# Patient Record
Sex: FEMALE | Race: BLACK OR AFRICAN AMERICAN | NOT HISPANIC OR LATINO | URBAN - METROPOLITAN AREA
[De-identification: names, ages, dates, MRNs, and addresses within clinical notes are randomized per-mention and may not be internally consistent; named-entity substitution may affect disease eponyms.]

---

## 2021-05-27 ENCOUNTER — EMERGENCY (EMERGENCY)
Facility: HOSPITAL | Age: 51
LOS: 1 days | Discharge: HOME | End: 2021-05-27
Attending: EMERGENCY MEDICINE | Admitting: EMERGENCY MEDICINE
Payer: MEDICAID

## 2021-05-27 VITALS
SYSTOLIC BLOOD PRESSURE: 118 MMHG | HEART RATE: 83 BPM | RESPIRATION RATE: 14 BRPM | DIASTOLIC BLOOD PRESSURE: 66 MMHG | OXYGEN SATURATION: 100 % | TEMPERATURE: 98 F

## 2021-05-27 PROCEDURE — 99284 EMERGENCY DEPT VISIT MOD MDM: CPT

## 2021-05-27 RX ORDER — ACETAMINOPHEN 500 MG
650 TABLET ORAL ONCE
Refills: 0 | Status: COMPLETED | OUTPATIENT
Start: 2021-05-27 | End: 2021-05-27

## 2021-05-27 RX ORDER — IBUPROFEN 200 MG
600 TABLET ORAL ONCE
Refills: 0 | Status: COMPLETED | OUTPATIENT
Start: 2021-05-27 | End: 2021-05-27

## 2021-05-27 RX ADMIN — Medication 650 MILLIGRAM(S): at 18:36

## 2021-05-27 RX ADMIN — Medication 600 MILLIGRAM(S): at 18:36

## 2021-05-27 NOTE — ED PROVIDER NOTE - ATTENDING CONTRIBUTION TO CARE
I have personally performed a face to face bedside history and physical examination of this patient. I have discussed the history, examination, review of systems, assessment and plan of management with the resident. I have reviewed the electronic medical record and amended it to reflect my history, review of systems, physical exam, assessment and plan.    Brief HPI:  51 F no pertinent PMH here after inhaling clorox and ajax mix while cleaning the bathroom.  Patient states she mixed  then felt sx.  Feels better since arrival to ed.  Denies sob, nausea, vomiting, headache, cp.  Did not use ammonia containing product per patient.  No other complaints.     Vitals:   Reviewed    Exam:    GEN:  Non-toxic appearing, non-distressed, speaking full sentences, non-diaphoretic, AAOx3  HEENT:  NCAT, neck supple, EOMI, PERRLA, sclera anicteric, no conjunctival pallor or injection, no stridor, normal voice, no tonsillar exudate, uvula midline  CV:  regular rhythm and rate, s1/s2 audible, no murmurs, rubs or gallops, peripheral pulses 2+ and symmetric  PULM:  non-labored respirations, lungs clear to auscultation bilaterally, no wheezes, crackles or rales  ABD:  non distended, non-tender, no rebound, no guarding, negative Rodriguez's sign, bowel sounds normal, no cvat  MSK:  no gross deformity, non-tender extremities and joints, range of motion grossly normal appearing, no extremity edema, extremities warm and well perfused   NEURO:  AAOx3, CN II-XII intact, motor 5/5 in upper and lower extremities bilaterally, sensation grossly intact in extremities and trunk, no gait deficit  SKIN:  warm, dry, no rash or vesicles     A/P:  51 F no pertinent PMH here after inhaling clorox and ajax mix while cleaning the bathroom.   VSS.  Exam non-toxic appearing, lungs ctab, no focal neuro deficit, no gait deficit.  Patient states she feels better since arriving to ED.  Low c/f clinically significant toxic ingestion or inhalation.  Will obtain urine hcg.  Anticipate discharge.

## 2021-05-27 NOTE — ED PROVIDER NOTE - PROGRESS NOTE DETAILS
Samreen Vasquez EMIM PGY#1 feeling better after tylenol and ibuprofen. Will dc after urine pregnancy test

## 2021-05-27 NOTE — ED PROVIDER NOTE - OBJECTIVE STATEMENT
51F no pertinent PMH here after inhaling clorox and ajax mix while cleaning the bathroom. States that she felt dizziness and nausea, the nausea was alleviated by sour patch and the dizziness (felt unsteady on feet after standing up), has now dissipated, is feeling headache now. Patient states she did not eat prior. No other symptoms.

## 2021-05-27 NOTE — ED PROVIDER NOTE - CLINICAL SUMMARY MEDICAL DECISION MAKING FREE TEXT BOX
51F no pertinent PMH here for dizziness and headache after inhaling 51F no pertinent PMH here for dizziness and headache after inhaling . Only complaining of headache now. VSS, PE nonfocal, no FNDs. Will give tylenol and NSAID and reassess.

## 2021-05-27 NOTE — ED PROVIDER NOTE - NS ED ROS FT
Gen: No fever, normal appetite  Eyes: No eye irritation or discharge  ENT: No ear pain, congestion, sore throat  Resp: No cough or trouble breathing  Cardiovascular: No chest pain or palpitation  Gastroenteric: No nausea/vomiting, diarrhea, constipation  :  No change in urine output; no dysuria  MS: No joint or muscle pain  Skin: No rashes  Neuro: (+) headache; (+) dizziness no abnormal movements  Remainder negative, except as per the HPI

## 2021-05-27 NOTE — ED ADULT TRIAGE NOTE - CHIEF COMPLAINT QUOTE
Pt presents to ED for intermittent dizziness x2hrs. Pt states "2hrs ago I started cleaning by bathtub but I mixed 2 different kinds of clorox, bleach, ajax and dishwashing detergent." Denies palpitations, chest pain.

## 2021-05-27 NOTE — ED PROVIDER NOTE - PATIENT PORTAL LINK FT
You can access the FollowMyHealth Patient Portal offered by [facility] by registering at the following website: [email or web address]. By joining [employer] FollowMyHealth portal, you will also be able to view your health information using other applications (apps) compatible with our system.

## 2021-05-27 NOTE — ED PROVIDER NOTE - NSFOLLOWUPINSTRUCTIONS_ED_ALL_ED_FT
You were seen in the ED for headache and dizziness after inhaling An acute headache is pain or discomfort that starts suddenly and gets worse quickly. You may have an acute headache only when you feel stress or eat certain foods. Other acute headache pain can happen every day, and sometimes several times a day.     DISCHARGE INSTRUCTIONS:    Return to the emergency department if:   •You have severe pain.  •You have numbness or weakness on one side of your face or body.  •You have a headache that occurs after a blow to the head, a fall, or other trauma.   •You have a headache, are forgetful or confused, or have trouble speaking.  •You have a headache, stiff neck, and a fever.    Contact your healthcare provider if:   •You have a constant headache and are vomiting.  •You have a headache each day that does not get better, even after treatment.  •You have changes in your headaches, or new symptoms that occur when you have a headache.  •You have questions or concerns about your condition or care.    Medicines: You may need any of the following:   •Prescription pain medicine may be given. The medicine your healthcare provider recommends will depend on the kind of headaches you have. You will need to take prescription headache medicines as directed to prevent a problem called rebound headache. These headaches happen with regular use of pain relievers for headache disorders.  •NSAIDs, such as ibuprofen, help decrease swelling, pain, and fever. This medicine is available with or without a doctor's order. NSAIDs can cause stomach bleeding or kidney problems in certain people. If you take blood thinner medicine, always ask your healthcare provider if NSAIDs are safe for you. Always read the medicine label and follow directions.  •Acetaminophen decreases pain and fever. It is available without a doctor's order. Ask how much to take and how often to take it. Follow directions. Read the labels of all other medicines you are using to see if they also contain acetaminophen, or ask your doctor or pharmacist. Acetaminophen can cause liver damage if not taken correctly. Do not use more than 3 grams (3,000 milligrams) total of acetaminophen in one day.   •Antidepressants may be given for some kinds of headaches.   •Take your medicine as directed. Contact your healthcare provider if you think your medicine is not helping or if you have side effects. Tell him or her if you are allergic to any medicine. Keep a list of the medicines, vitamins, and herbs you take. Include the amounts, and when and why you take them. Bring the list or the pill bottles to follow-up visits. Carry your medicine list with you in case of an emergency.

## 2021-12-12 ENCOUNTER — EMERGENCY (EMERGENCY)
Facility: HOSPITAL | Age: 51
LOS: 1 days | Discharge: HOME | End: 2021-12-12
Attending: EMERGENCY MEDICINE | Admitting: EMERGENCY MEDICINE
Payer: MEDICAID

## 2021-12-12 VITALS
SYSTOLIC BLOOD PRESSURE: 159 MMHG | OXYGEN SATURATION: 100 % | RESPIRATION RATE: 16 BRPM | HEART RATE: 120 BPM | TEMPERATURE: 100 F | DIASTOLIC BLOOD PRESSURE: 90 MMHG

## 2021-12-12 LAB
ALBUMIN SERPL ELPH-MCNC: 3.9 G/DL — SIGNIFICANT CHANGE UP (ref 3.3–5)
ALP SERPL-CCNC: 89 U/L — SIGNIFICANT CHANGE UP (ref 40–120)
ALT FLD-CCNC: 45 U/L — HIGH (ref 4–33)
ANION GAP SERPL CALC-SCNC: 13 MMOL/L — SIGNIFICANT CHANGE UP (ref 7–14)
AST SERPL-CCNC: 48 U/L — HIGH (ref 4–32)
BASOPHILS # BLD AUTO: 0.02 K/UL — SIGNIFICANT CHANGE UP (ref 0–0.2)
BASOPHILS NFR BLD AUTO: 0.2 % — SIGNIFICANT CHANGE UP (ref 0–2)
BILIRUB SERPL-MCNC: 0.8 MG/DL — SIGNIFICANT CHANGE UP (ref 0.2–1.2)
BUN SERPL-MCNC: 10 MG/DL — SIGNIFICANT CHANGE UP (ref 7–23)
CALCIUM SERPL-MCNC: 9.5 MG/DL — SIGNIFICANT CHANGE UP (ref 8.4–10.5)
CHLORIDE SERPL-SCNC: 101 MMOL/L — SIGNIFICANT CHANGE UP (ref 98–107)
CO2 SERPL-SCNC: 22 MMOL/L — SIGNIFICANT CHANGE UP (ref 22–31)
CREAT SERPL-MCNC: 0.8 MG/DL — SIGNIFICANT CHANGE UP (ref 0.5–1.3)
EOSINOPHIL # BLD AUTO: 0 K/UL — SIGNIFICANT CHANGE UP (ref 0–0.5)
EOSINOPHIL NFR BLD AUTO: 0 % — SIGNIFICANT CHANGE UP (ref 0–6)
GLUCOSE SERPL-MCNC: 134 MG/DL — HIGH (ref 70–99)
HCT VFR BLD CALC: 40.4 % — SIGNIFICANT CHANGE UP (ref 34.5–45)
HGB BLD-MCNC: 12.6 G/DL — SIGNIFICANT CHANGE UP (ref 11.5–15.5)
IANC: 8.55 K/UL — HIGH (ref 1.5–8.5)
IMM GRANULOCYTES NFR BLD AUTO: 0.8 % — SIGNIFICANT CHANGE UP (ref 0–1.5)
LYMPHOCYTES # BLD AUTO: 1.37 K/UL — SIGNIFICANT CHANGE UP (ref 1–3.3)
LYMPHOCYTES # BLD AUTO: 12.5 % — LOW (ref 13–44)
MCHC RBC-ENTMCNC: 25.4 PG — LOW (ref 27–34)
MCHC RBC-ENTMCNC: 31.2 GM/DL — LOW (ref 32–36)
MCV RBC AUTO: 81.5 FL — SIGNIFICANT CHANGE UP (ref 80–100)
MONOCYTES # BLD AUTO: 0.89 K/UL — SIGNIFICANT CHANGE UP (ref 0–0.9)
MONOCYTES NFR BLD AUTO: 8.2 % — SIGNIFICANT CHANGE UP (ref 2–14)
NEUTROPHILS # BLD AUTO: 8.55 K/UL — HIGH (ref 1.8–7.4)
NEUTROPHILS NFR BLD AUTO: 78.3 % — HIGH (ref 43–77)
NRBC # BLD: 0 /100 WBCS — SIGNIFICANT CHANGE UP
NRBC # FLD: 0 K/UL — SIGNIFICANT CHANGE UP
PLATELET # BLD AUTO: 411 K/UL — HIGH (ref 150–400)
POTASSIUM SERPL-MCNC: 4.2 MMOL/L — SIGNIFICANT CHANGE UP (ref 3.5–5.3)
POTASSIUM SERPL-SCNC: 4.2 MMOL/L — SIGNIFICANT CHANGE UP (ref 3.5–5.3)
PROT SERPL-MCNC: 8.3 G/DL — SIGNIFICANT CHANGE UP (ref 6–8.3)
RBC # BLD: 4.96 M/UL — SIGNIFICANT CHANGE UP (ref 3.8–5.2)
RBC # FLD: 14.2 % — SIGNIFICANT CHANGE UP (ref 10.3–14.5)
SODIUM SERPL-SCNC: 136 MMOL/L — SIGNIFICANT CHANGE UP (ref 135–145)
TROPONIN T, HIGH SENSITIVITY RESULT: 14 NG/L — SIGNIFICANT CHANGE UP
WBC # BLD: 10.92 K/UL — HIGH (ref 3.8–10.5)
WBC # FLD AUTO: 10.92 K/UL — HIGH (ref 3.8–10.5)

## 2021-12-12 PROCEDURE — 71046 X-RAY EXAM CHEST 2 VIEWS: CPT | Mod: 26

## 2021-12-12 PROCEDURE — 99285 EMERGENCY DEPT VISIT HI MDM: CPT

## 2021-12-12 RX ORDER — DIAZEPAM 5 MG
5 TABLET ORAL ONCE
Refills: 0 | Status: DISCONTINUED | OUTPATIENT
Start: 2021-12-12 | End: 2021-12-12

## 2021-12-12 RX ORDER — DIAZEPAM 5 MG
1 TABLET ORAL
Qty: 10 | Refills: 0
Start: 2021-12-12 | End: 2021-12-16

## 2021-12-12 RX ORDER — KETOROLAC TROMETHAMINE 30 MG/ML
15 SYRINGE (ML) INJECTION ONCE
Refills: 0 | Status: DISCONTINUED | OUTPATIENT
Start: 2021-12-12 | End: 2021-12-12

## 2021-12-12 RX ORDER — ACETAMINOPHEN 500 MG
975 TABLET ORAL ONCE
Refills: 0 | Status: DISCONTINUED | OUTPATIENT
Start: 2021-12-12 | End: 2021-12-12

## 2021-12-12 RX ORDER — LIDOCAINE 4 G/100G
2 CREAM TOPICAL ONCE
Refills: 0 | Status: COMPLETED | OUTPATIENT
Start: 2021-12-12 | End: 2021-12-12

## 2021-12-12 RX ORDER — DIAZEPAM 5 MG
1 TABLET ORAL
Qty: 6 | Refills: 0
Start: 2021-12-12 | End: 2021-12-14

## 2021-12-12 RX ADMIN — Medication 15 MILLIGRAM(S): at 20:14

## 2021-12-12 RX ADMIN — Medication 5 MILLIGRAM(S): at 21:21

## 2021-12-12 RX ADMIN — LIDOCAINE 2 PATCH: 4 CREAM TOPICAL at 20:14

## 2021-12-12 NOTE — ED PROVIDER NOTE - PROGRESS NOTE DETAILS
Patient feels significantly better following treatment with valium. Patient is ready for discharge. Alexandrea, DO (PGY-1)

## 2021-12-12 NOTE — ED PROVIDER NOTE - NSFOLLOWUPINSTRUCTIONS_ED_ALL_ED_FT
Please follow up with your primary care physician within the next 4-6 days.    Muscle Cramps and Spasms    Muscle cramps and spasms occur when a muscle or muscles tighten and you have no control over this tightening (involuntary muscle contraction). They are a common problem and can develop in any muscle. The most common place is in the calf muscles of the leg. Muscle cramps and muscle spasms are both involuntary muscle contractions, but there are some differences between the two:    Muscle cramps are painful. They come and go and may last for a few seconds or up to 15 minutes. Muscle cramps are often more forceful and last longer than muscle spasms.  Muscle spasms may or may not be painful. They may also last just a few seconds or much longer.    Certain medical conditions, such as diabetes or Parkinson's disease, can make it more likely to develop cramps or spasms. However, cramps or spasms are usually not caused by a serious underlying problem. Common causes include:    Doing more physical work or exercise than your body is ready for (overexertion).  Overuse from repeating certain movements too many times.  Remaining in a certain position for a long period of time.  Improper preparation, form, or technique while playing a sport or doing an activity.  Dehydration.  Injury.  Side effects of some medicines.  Abnormally low levels of the salts and minerals in your blood (electrolytes), especially potassium and calcium. This could happen if you are taking water pills (diuretics) or if you are pregnant.    In many cases, the cause of muscle cramps or spasms is not known.    Follow these instructions at home:      Managing pain and stiffness      Try massaging, stretching, and relaxing the affected muscle. Do this for several minutes at a time.  If directed, apply heat to tight or tense muscles as often as told by your health care provider. Use the heat source that your health care provider recommends, such as a moist heat pack or a heating pad.    Place a towel between your skin and the heat source.  Leave the heat on for 20–30 minutes.  Remove the heat if your skin turns bright red. This is especially important if you are unable to feel pain, heat, or cold. You may have a greater risk of getting burned.  If directed, put ice on the affected area. This may help if you are sore or have pain after a cramp or spasm.    Put ice in a plastic bag.  Place a towel between your skin and the bag.  Leave the ice on for 20 minutes, 2–3 times a day.  Try taking hot showers or baths to help relax tight muscles.        Eating and drinking    Drink enough fluid to keep your urine pale yellow. Staying well hydrated may help prevent cramps or spasms.  Eat a healthy diet that includes plenty of nutrients to help your muscles function. A healthy diet includes fruits and vegetables, lean protein, whole grains, and low-fat or nonfat dairy products.        General instructions    If you are having frequent cramps, avoid intense exercise for several days.  Take over-the-counter and prescription medicines only as told by your health care provider.  Pay attention to any changes in your symptoms.  Keep all follow-up visits as told by your health care provider. This is important.    Contact a health care provider if:  Your cramps or spasms get more severe or happen more often.  Your cramps or spasms do not improve over time.    Summary  Muscle cramps and spasms occur when a muscle or muscles tighten and you have no control over this tightening (involuntary muscle contraction).  The most common place for cramps or spasms to occur is in the calf muscles of the leg.  Massaging, stretching, and relaxing the affected muscle may relieve the cramp or spasm.  Drink enough fluid to keep your urine pale yellow. Staying well hydrated may help prevent cramps or spasms.    ADDITIONAL NOTES AND INSTRUCTIONS    Please follow up with your Primary MD in 24-48 hr.  Seek immediate medical care for any new/worsening signs or symptoms.

## 2021-12-12 NOTE — ED ADULT NURSE NOTE - OBJECTIVE STATEMENT
Pt received to [facility] DAVIES x 3 c/o right side rib pain that radiates to the right clavicle for 2 days, denies inury or trauma. Labs sent and pt medicated for pain.

## 2021-12-12 NOTE — ED PROVIDER NOTE - CLINICAL SUMMARY MEDICAL DECISION MAKING FREE TEXT BOX
Patient having right ribcage and right clavicular pain that is non traumatic. Patient is having intermittent pain in the room with hypertonic muscles around right lower ribcage and right clavicle. Most likely muscle spasm. Concerned that the patient has a low grade fever at 100.4 F. labs, troponin, cxr, toradol, lidocaine patch.

## 2021-12-12 NOTE — ED PROVIDER NOTE - PATIENT PORTAL LINK FT
You can access the FollowMyHealth Patient Portal offered by  by registering at the following website: [email or web address]. By joining [employer]’s FollowMyHealth portal, you will also be able to view your health information using other applications (apps) compatible with our system.

## 2021-12-12 NOTE — ED ADULT TRIAGE NOTE - CHIEF COMPLAINT QUOTE
Pt presents with rt sided rib pain radiating to rt clavicle, constant for the past 2 days, pt denies SOB, no headache/dizziness, pt denies any recent injuries, no recent heavy lifting or strenuous activity.

## 2021-12-12 NOTE — ED PROVIDER NOTE - ATTENDING CONTRIBUTION TO CARE
I have seen and examined the patient on the patient´s visit date. I have reviewed the note written by Derek Lechuga MD on that visit day. I have supervised and participated as necessary in the performance of procedures indicated for patient management and was available at all phases of the patient´s visit when needed. We discussed the history, physical exam findings, management plan, and  medical decision making. I have made my additions, exceptions, and revisions within the chart and I agree with H and P as documented in its entirety. The data and my interpretation of any data collected from labs, interventions and imaging appear below as well as my independent medical decision making and considerations  The patient is a morbidly obese 51y Female who has a past medical and surgery history of no pertinent past medical history PTED with what appears to be severe muscle cramping in right upper pectoralis major muscle bundle    Vital Signs Last 24 Hrs  T(F): 100.1 HR: 120 BP: 159/90 RR: 16 SpO2: 100% (12 Dec 2021 18:22) (100% - 100%)  PE: as described; my additions and exceptions are noted in the chart; Palpable tender muscle bundle assymetric with opposite side     DATA:  LAB:                         12.6   10.92 )-----------( 411      ( 12 Dec 2021 21:09 )             40.4       136  |  101  |  10  ----------------------------<  134<H>  4.2   |  22  |  0.80    Ca    9.5      12 Dec 2021 21:08    TPro  8.3  /  Alb  3.9  /  TBili  0.8  /  DBili  x   /  AST  48<H>  /  ALT  45<H>  /  AlkPhos  89  12-12  Troponin T, High Sensitivity Result: 14 ng/L (12-12-21 @ 21:08)     IMPRESSION/RISK:  Dx= muscle spasm   Consideration include: +response to valium; will prescribe short course and d/c with followup  Plan  as above

## 2021-12-12 NOTE — ED PROVIDER NOTE - OBJECTIVE STATEMENT
51 year old female presenting with ribcage pain. Pain began 2 days ago while sitting in a chair. Pain is located at the right lower ribcage and right clavicle. Pain is intermittent, non-radiating. Nothing makes the pain better or worse. Denies HA, SOB, abdominal pain, nausea, vomiting, recent travel, leg swelling, smoking, trauma. Patient took tylenol with minimal relief.

## 2021-12-12 NOTE — ED PROVIDER NOTE - NSFOLLOWUPCLINICS_GEN_ALL_ED_FT
[facility]  Family Medicine  [address] [place] [id number]  Phone: [phone]  Fax:   Follow Up Time: 4-6 Days

## 2021-12-12 NOTE — ED PROVIDER NOTE - PHYSICAL EXAMINATION
gen: Appears in pain  Mentation: AAO x 3  psych: mood appropriate  HEENT: airway patent, conjunctivae clear bilaterally  Cardio: RRR, no m/r/g  Resp: normal BS b/l  GI: soft/nondistended/nontender  Skin: No evidence of rash  MSK: TTP in right lower ribcage ribs 10-12 and subclavicular area  Lymph/Vasc: no LE edema

## 2021-12-14 ENCOUNTER — INPATIENT (INPATIENT)
Facility: HOSPITAL | Age: 51
LOS: 5 days | Discharge: HOME | End: 2021-12-20
Attending: GENERAL ACUTE CARE HOSPITAL | Admitting: GENERAL ACUTE CARE HOSPITAL
Payer: MEDICAID

## 2021-12-14 VITALS
TEMPERATURE: 99 F | RESPIRATION RATE: 17 BRPM | HEART RATE: 100 BPM | SYSTOLIC BLOOD PRESSURE: 98 MMHG | DIASTOLIC BLOOD PRESSURE: 58 MMHG | OXYGEN SATURATION: 98 %

## 2021-12-14 DIAGNOSIS — I26.99 OTHER PULMONARY EMBOLISM WITHOUT ACUTE COR PULMONALE: ICD-10-CM

## 2021-12-14 PROBLEM — Z78.9 OTHER SPECIFIED HEALTH STATUS: Chronic | Status: ACTIVE | Noted: 2021-12-12

## 2021-12-14 LAB
ALBUMIN SERPL ELPH-MCNC: 3.7 G/DL — SIGNIFICANT CHANGE UP (ref 3.3–5)
ALP SERPL-CCNC: 101 U/L — SIGNIFICANT CHANGE UP (ref 40–120)
ALT FLD-CCNC: 45 U/L — HIGH (ref 4–33)
ANION GAP SERPL CALC-SCNC: 12 MMOL/L — SIGNIFICANT CHANGE UP (ref 7–14)
APTT BLD: 31.5 SEC — SIGNIFICANT CHANGE UP (ref 27–36.3)
AST SERPL-CCNC: 31 U/L — SIGNIFICANT CHANGE UP (ref 4–32)
BASOPHILS # BLD AUTO: 0.03 K/UL — SIGNIFICANT CHANGE UP (ref 0–0.2)
BASOPHILS NFR BLD AUTO: 0.2 % — SIGNIFICANT CHANGE UP (ref 0–2)
BILIRUB SERPL-MCNC: 0.7 MG/DL — SIGNIFICANT CHANGE UP (ref 0.2–1.2)
BUN SERPL-MCNC: 11 MG/DL — SIGNIFICANT CHANGE UP (ref 7–23)
CALCIUM SERPL-MCNC: 9 MG/DL — SIGNIFICANT CHANGE UP (ref 8.4–10.5)
CHLORIDE SERPL-SCNC: 99 MMOL/L — SIGNIFICANT CHANGE UP (ref 98–107)
CO2 SERPL-SCNC: 24 MMOL/L — SIGNIFICANT CHANGE UP (ref 22–31)
CREAT SERPL-MCNC: 0.86 MG/DL — SIGNIFICANT CHANGE UP (ref 0.5–1.3)
EOSINOPHIL # BLD AUTO: 0 K/UL — SIGNIFICANT CHANGE UP (ref 0–0.5)
EOSINOPHIL NFR BLD AUTO: 0 % — SIGNIFICANT CHANGE UP (ref 0–6)
GLUCOSE SERPL-MCNC: 128 MG/DL — HIGH (ref 70–99)
HCT VFR BLD CALC: 36.6 % — SIGNIFICANT CHANGE UP (ref 34.5–45)
HGB BLD-MCNC: 11.4 G/DL — LOW (ref 11.5–15.5)
IANC: 9.75 K/UL — HIGH (ref 1.5–8.5)
IMM GRANULOCYTES NFR BLD AUTO: 0.5 % — SIGNIFICANT CHANGE UP (ref 0–1.5)
INR BLD: 1.72 RATIO — HIGH (ref 0.88–1.16)
LYMPHOCYTES # BLD AUTO: 1.62 K/UL — SIGNIFICANT CHANGE UP (ref 1–3.3)
LYMPHOCYTES # BLD AUTO: 12.8 % — LOW (ref 13–44)
MCHC RBC-ENTMCNC: 25.4 PG — LOW (ref 27–34)
MCHC RBC-ENTMCNC: 31.1 GM/DL — LOW (ref 32–36)
MCV RBC AUTO: 81.7 FL — SIGNIFICANT CHANGE UP (ref 80–100)
MONOCYTES # BLD AUTO: 1.16 K/UL — HIGH (ref 0–0.9)
MONOCYTES NFR BLD AUTO: 9.2 % — SIGNIFICANT CHANGE UP (ref 2–14)
NEUTROPHILS # BLD AUTO: 9.75 K/UL — HIGH (ref 1.8–7.4)
NEUTROPHILS NFR BLD AUTO: 77.3 % — HIGH (ref 43–77)
NRBC # BLD: 0 /100 WBCS — SIGNIFICANT CHANGE UP
NRBC # FLD: 0 K/UL — SIGNIFICANT CHANGE UP
NT-PROBNP SERPL-SCNC: 16 PG/ML — SIGNIFICANT CHANGE UP
PLATELET # BLD AUTO: 477 K/UL — HIGH (ref 150–400)
POTASSIUM SERPL-MCNC: 4.3 MMOL/L — SIGNIFICANT CHANGE UP (ref 3.5–5.3)
POTASSIUM SERPL-SCNC: 4.3 MMOL/L — SIGNIFICANT CHANGE UP (ref 3.5–5.3)
PROT SERPL-MCNC: 8 G/DL — SIGNIFICANT CHANGE UP (ref 6–8.3)
PROTHROM AB SERPL-ACNC: 19.3 SEC — HIGH (ref 10.6–13.6)
RBC # BLD: 4.48 M/UL — SIGNIFICANT CHANGE UP (ref 3.8–5.2)
RBC # FLD: 14.4 % — SIGNIFICANT CHANGE UP (ref 10.3–14.5)
SARS-COV-2 RNA SPEC QL NAA+PROBE: DETECTED
SODIUM SERPL-SCNC: 135 MMOL/L — SIGNIFICANT CHANGE UP (ref 135–145)
TROPONIN T, HIGH SENSITIVITY RESULT: 14 NG/L — SIGNIFICANT CHANGE UP
WBC # BLD: 12.62 K/UL — HIGH (ref 3.8–10.5)
WBC # FLD AUTO: 12.62 K/UL — HIGH (ref 3.8–10.5)

## 2021-12-14 PROCEDURE — 99291 CRITICAL CARE FIRST HOUR: CPT | Mod: 25

## 2021-12-14 PROCEDURE — 93010 ELECTROCARDIOGRAM REPORT: CPT

## 2021-12-14 PROCEDURE — 71046 X-RAY EXAM CHEST 2 VIEWS: CPT | Mod: 26

## 2021-12-14 PROCEDURE — 73030 X-RAY EXAM OF SHOULDER: CPT | Mod: 26,RT

## 2021-12-14 PROCEDURE — 71275 CT ANGIOGRAPHY CHEST: CPT | Mod: 26,MA

## 2021-12-14 RX ORDER — IBUPROFEN 200 MG
400 TABLET ORAL ONCE
Refills: 0 | Status: COMPLETED | OUTPATIENT
Start: 2021-12-14 | End: 2021-12-14

## 2021-12-14 RX ORDER — HEPARIN SODIUM 5000 [USP'U]/ML
10000 INJECTION INTRAVENOUS; SUBCUTANEOUS EVERY 6 HOURS
Refills: 0 | Status: DISCONTINUED | OUTPATIENT
Start: 2021-12-14 | End: 2021-12-20

## 2021-12-14 RX ORDER — IBUPROFEN 200 MG
400 TABLET ORAL ONCE
Refills: 0 | Status: COMPLETED | OUTPATIENT
Start: 2021-12-14 | End: 2021-12-15

## 2021-12-14 RX ORDER — HEPARIN SODIUM 5000 [USP'U]/ML
5000 INJECTION INTRAVENOUS; SUBCUTANEOUS EVERY 6 HOURS
Refills: 0 | Status: DISCONTINUED | OUTPATIENT
Start: 2021-12-14 | End: 2021-12-20

## 2021-12-14 RX ORDER — HEPARIN SODIUM 5000 [USP'U]/ML
INJECTION INTRAVENOUS; SUBCUTANEOUS
Qty: 25000 | Refills: 0 | Status: DISCONTINUED | OUTPATIENT
Start: 2021-12-14 | End: 2021-12-20

## 2021-12-14 RX ORDER — ACETAMINOPHEN 500 MG
975 TABLET ORAL ONCE
Refills: 0 | Status: COMPLETED | OUTPATIENT
Start: 2021-12-14 | End: 2021-12-14

## 2021-12-14 RX ORDER — HEPARIN SODIUM 5000 [USP'U]/ML
10000 INJECTION INTRAVENOUS; SUBCUTANEOUS ONCE
Refills: 0 | Status: COMPLETED | OUTPATIENT
Start: 2021-12-14 | End: 2021-12-14

## 2021-12-14 RX ORDER — DIAZEPAM 5 MG
5 TABLET ORAL ONCE
Refills: 0 | Status: DISCONTINUED | OUTPATIENT
Start: 2021-12-14 | End: 2021-12-14

## 2021-12-14 RX ADMIN — Medication 400 MILLIGRAM(S): at 15:19

## 2021-12-14 RX ADMIN — Medication 975 MILLIGRAM(S): at 15:19

## 2021-12-14 RX ADMIN — HEPARIN SODIUM 2200 UNIT(S)/HR: 5000 INJECTION INTRAVENOUS; SUBCUTANEOUS at 21:43

## 2021-12-14 RX ADMIN — Medication 5 MILLIGRAM(S): at 15:25

## 2021-12-14 RX ADMIN — HEPARIN SODIUM 10000 UNIT(S): 5000 INJECTION INTRAVENOUS; SUBCUTANEOUS at 21:46

## 2021-12-14 NOTE — ED PROVIDER NOTE - CLINICAL SUMMARY MEDICAL DECISION MAKING FREE TEXT BOX
Pt here for atraumatic R shoulder/anterior chest pain. VSS. Exam w/ limited active ROM of R shoulder but passive intact. Not consistent w/ septic joint or shoulder dislocation. Likely msk including calcific tendinitis but will obtain labs, ekg, ct given CXR previously w/ questionable pna. Pt here for atraumatic R shoulder/anterior chest pain. VSS. Exam w/ limited active ROM of R shoulder but passive intact. Not consistent w/ septic joint or shoulder dislocation. Likely msk including calcific tendinitis but will obtain labs, ekg, ctpe given level of pain and CXR previously w/ questionable pna.

## 2021-12-14 NOTE — ED ADULT NURSE NOTE - OBJECTIVE STATEMENT
Pt c/o Rt shoulder pain, has call back from hospital for Xray results. Medicated, xray ordered, continue to monitor.

## 2021-12-14 NOTE — ED POST DISCHARGE NOTE - RESULT SUMMARY
CXR : bilateral lower ling opacities predominately linear suggesting linear and platelike atelectasis although PNA not excluded in the appropriate clinical context. Rt ribs not seen in their entirety. Dedicated Rt rib and clavicle xrays can be done. Patient contacted still having pain. Discussed with patient to follow up with MD. Patient has no MD. Suggested patient can follow up with [facility] care. Patient rather return to [facility] for further evaluation. Patient to return to [facility].

## 2021-12-14 NOTE — ED ADULT NURSE REASSESSMENT NOTE - NS ED NURSE REASSESS COMMENT FT1
Heparin drip initiated, pt educated on signs and symptoms of bleeding, pt verbalized understanding. PT aware of plan of care. Report given to MARSHA PALACIO.

## 2021-12-14 NOTE — ED ADULT TRIAGE NOTE - CHIEF COMPLAINT QUOTE
pt was seen for muscles spasms to right shoulder, had cxr, was told to come back "because they had seen something on the xray"

## 2021-12-14 NOTE — ED PROVIDER NOTE - OBJECTIVE STATEMENT
52yo F otherwise well comes to ED for cp. R anterior chest, x3 days, came in few days ago for same, xray w/o msk findings, given valium w/ improvement. Today returns with same shoulder/upper R chest pain radiating to R chest, worse with deep breathing. States tylenol helps more than valium. No injuries. Denies fever, cough, sob, abdominal pain, n/v, change in urine or bowel.

## 2021-12-14 NOTE — ED PROVIDER NOTE - PROGRESS NOTE DETAILS
ASTER Pollock (PGY-2) - EKG unchanged, CXR w/ R pleural effusion, will obtain CTPE, also assess for pna. ASTER Pollock (PGY-2) - CTPE showing segmental PE on R w/ pulmonary infarct of the lower lobe. Obtaining weight, coags, will heparinize. Charge made aware, will transition pt to main. DATA:  EKG: pending at time of evaluation  LAB: Pending at time of evaluation                        11.4   12.62 )-----------( 477      ( 14 Dec 2021 16:43 )             36.6   Mean Cell Volume: 81.7 fL (12-14-21 @ 16:43)  Auto Neutrophil %: 77.3 % (12-14-21 @ 16:43)  Auto Eosinophil %: 0.0 % (12-14-21 @ 16:43)  12-14    135  |  99  |  11  ----------------------------<  128<H>  4.3   |  24  |  0.86    Ca    9.0      14 Dec 2021 16:43    TPro  8.0  /  Alb  3.7  /  TBili  0.7  /  DBili  x   /  AST  31  /  ALT  45<H>  /  AlkPhos  101  12-14        IMPRESSION/RISK:  Dx=  Differential includes but not limited to conditions listed in order of most possible first:   Consideration include  Plan  will prognosticate with trop and BNP; to be admitted for heparinization and further management (ECHO, etc)  will reassess

## 2021-12-14 NOTE — ED PROVIDER NOTE - PHYSICAL EXAMINATION
General: NAD  HEENT: NCAT, PERRL  Cardiac: RRR, no murmurs, 2+ peripheral pulses  Chest: CTA, +mild ttp of R trapezius, -clavicular deformities or tenderness, Passive ROM of shoulder intact, shoulder joint not dislocated not warm  Abdomen: soft, non-distended, bowel sounds present, no ttp, no rebound or guarding  Extremities: no peripheral edema, calf tenderness, or leg size discrepancies  Skin: no rashes  Neuro: AAOx3, motor and sensory grossly intact  Psych: mood and affect appropriate

## 2021-12-14 NOTE — ED PROVIDER NOTE - CARE PLAN
Principal Discharge DX:	Shoulder pain   1 Principal Discharge DX:	Pulmonary embolism and infarction

## 2021-12-14 NOTE — ED PROVIDER NOTE - ATTENDING CONTRIBUTION TO CARE
I have seen and examined the patient on the patient´s visit date. I have reviewed the note written by Pete Pollock MD, on that visit day. I have supervised and participated as necessary in the performance of procedures indicated for patient management and was available at all phases of the patient´s visit when needed. We discussed the history, physical exam findings, management plan, and  medical decision making. I have made my additions, exceptions, and revisions within the chart and I agree with H and P as documented in its entirety. The data and my interpretation of any data collected from labs, interventions and imaging appear below as well as my independent medical decision making and considerations  CRITICAL CARE TIME WAS NECESSARY TO TREAT OR PREVENT LIFE THREATENING DETERIORATION FROM THE FOLLOWING CONDITIONS:  [ X ] Heart Failure and/or Circulatory Collapse and or MI/NSTEMI  [  ] Sepsis [  ] Respiratory failure  [  ]CNS Failure or Compromise    [  ]Dehydration [  ]Renal Failure [  ]Hepatic Failure Sepsis [  ]Endocrine Crisis  [  ]Metabolic Crisis  [  ]Toxidrome   [  ]Trauma    CRITICAL CARE TIME WAS SPENT BY ME IN THE FOLLOWING ACTIVITIES:  [X  ] Performance of the History and Physical Examination [X  ] Review of Old Charts [   ] IV Access and or Obtaining Necessary Diagnostic Tests   [X  ] Ordering and Performing Treatments and Interventions:   Specifically:  [  ] Ventilator Management [  ] Vascular Access Procedures [  ] NGT Placement  [  ] Transcutaneous Pacing  [ X ] Establishment of Goals of Care with the Patient or Their Designated Representative  [X  ] Developing and Evaluating Treatment Plan with Consultants and/or the Primary Provider  [  ] Serial Reevaluation of the Patients Condition and Response to Therapeutic Measures   Specifically: [  ] Interpretation of Cardiac and Respiratory Parameters  [X  ]Ordering and Interpretating  Diagnostic Studies  Comments:  The patient is a 51y Female who denies any pertinent past medical history and is well known to me from prior visit PTED with similar chest wall pain    Vital Signs Last 24 Hrs  T(F): 99.1 HR: 100 BP: 98/58 RR: 17 SpO2: 98% (14 Dec 2021 12:32) (98% - 98%)  PE: as described; my additions and exceptions are noted in the chart  DATA:  EKG:  NSR@96; ?LAE;  c/w 12/12/21 EKG NSST changes have resolved  LAB: Pending at time of evaluation                        12.6   10.92 )-----------( 411      ( 12 Dec 2021 21:09 )             40.4   12-12    136  |  101  |  10  ----------------------------<  134<H>  4.2   |  22  |  0.80    Ca    9.5      12 Dec 2021 21:08    TPro  8.3  /  Alb  3.9  /  TBili  0.8  /  DBili  x   /  AST  48<H>  /  ALT  45<H>  /  AlkPhos  89  12-12    IMPRESSION/RISK:  Dx=chest wall pain probably MSK but with reread c/w PNA vs atelectasis  Consideration include will r/o PNA and atelectasis but pt also low bp and tachy; initial approach given presentations/findings=MSK but now ? PE is higher   Plan  diazepam    Tablet 5 milliGRAM(s) Oral  labs  CTPEP   repeat EKG   trop bnp   reassess  dispo as per results and response

## 2021-12-15 DIAGNOSIS — D33.4 BENIGN NEOPLASM OF SPINAL CORD: Chronic | ICD-10-CM

## 2021-12-15 DIAGNOSIS — I26.99 OTHER PULMONARY EMBOLISM WITHOUT ACUTE COR PULMONALE: ICD-10-CM

## 2021-12-15 DIAGNOSIS — M25.511 PAIN IN RIGHT SHOULDER: ICD-10-CM

## 2021-12-15 DIAGNOSIS — Z29.9 ENCOUNTER FOR PROPHYLACTIC MEASURES, UNSPECIFIED: ICD-10-CM

## 2021-12-15 DIAGNOSIS — U07.1 COVID-19: ICD-10-CM

## 2021-12-15 DIAGNOSIS — N39.0 URINARY TRACT INFECTION, SITE NOT SPECIFIED: ICD-10-CM

## 2021-12-15 LAB
A1C WITH ESTIMATED AVERAGE GLUCOSE RESULT: 5.6 % — SIGNIFICANT CHANGE UP (ref 4–5.6)
ANION GAP SERPL CALC-SCNC: 13 MMOL/L — SIGNIFICANT CHANGE UP (ref 7–14)
APPEARANCE UR: CLEAR — SIGNIFICANT CHANGE UP
APTT BLD: 81.2 SEC — HIGH (ref 27–36.3)
APTT BLD: 99.1 SEC — HIGH (ref 27–36.3)
BACTERIA # UR AUTO: ABNORMAL
BILIRUB UR-MCNC: NEGATIVE — SIGNIFICANT CHANGE UP
BUN SERPL-MCNC: 12 MG/DL — SIGNIFICANT CHANGE UP (ref 7–23)
CALCIUM SERPL-MCNC: 8.9 MG/DL — SIGNIFICANT CHANGE UP (ref 8.4–10.5)
CHLORIDE SERPL-SCNC: 100 MMOL/L — SIGNIFICANT CHANGE UP (ref 98–107)
CHOLEST SERPL-MCNC: 92 MG/DL — SIGNIFICANT CHANGE UP
CO2 SERPL-SCNC: 24 MMOL/L — SIGNIFICANT CHANGE UP (ref 22–31)
COLOR SPEC: YELLOW — SIGNIFICANT CHANGE UP
CREAT SERPL-MCNC: 0.8 MG/DL — SIGNIFICANT CHANGE UP (ref 0.5–1.3)
CRP SERPL-MCNC: 410.8 MG/L — HIGH
D DIMER BLD IA.RAPID-MCNC: 1065 NG/ML DDU — HIGH
DIFF PNL FLD: ABNORMAL
EPI CELLS # UR: SIGNIFICANT CHANGE UP /HPF (ref 0–5)
ESTIMATED AVERAGE GLUCOSE: 114 — SIGNIFICANT CHANGE UP
FERRITIN SERPL-MCNC: 182 NG/ML — HIGH (ref 15–150)
GLUCOSE SERPL-MCNC: 159 MG/DL — HIGH (ref 70–99)
GLUCOSE UR QL: NEGATIVE — SIGNIFICANT CHANGE UP
HCG UR QL: NEGATIVE — SIGNIFICANT CHANGE UP
HCT VFR BLD CALC: 37.9 % — SIGNIFICANT CHANGE UP (ref 34.5–45)
HDLC SERPL-MCNC: 34 MG/DL — LOW
HGB BLD-MCNC: 11.7 G/DL — SIGNIFICANT CHANGE UP (ref 11.5–15.5)
HYALINE CASTS # UR AUTO: SIGNIFICANT CHANGE UP /LPF (ref 0–7)
INR BLD: 1.67 RATIO — HIGH (ref 0.88–1.16)
KETONES UR-MCNC: NEGATIVE — SIGNIFICANT CHANGE UP
LDH SERPL L TO P-CCNC: 235 U/L — HIGH (ref 135–225)
LEUKOCYTE ESTERASE UR-ACNC: ABNORMAL
LIPID PNL WITH DIRECT LDL SERPL: 35 MG/DL — SIGNIFICANT CHANGE UP
MAGNESIUM SERPL-MCNC: 2.2 MG/DL — SIGNIFICANT CHANGE UP (ref 1.6–2.6)
MCHC RBC-ENTMCNC: 25.3 PG — LOW (ref 27–34)
MCHC RBC-ENTMCNC: 30.9 GM/DL — LOW (ref 32–36)
MCV RBC AUTO: 81.9 FL — SIGNIFICANT CHANGE UP (ref 80–100)
NITRITE UR-MCNC: POSITIVE
NON HDL CHOLESTEROL: 58 MG/DL — SIGNIFICANT CHANGE UP
NRBC # BLD: 0 /100 WBCS — SIGNIFICANT CHANGE UP
NRBC # FLD: 0 K/UL — SIGNIFICANT CHANGE UP
PH UR: 6 — SIGNIFICANT CHANGE UP (ref 5–8)
PHOSPHATE SERPL-MCNC: 2.9 MG/DL — SIGNIFICANT CHANGE UP (ref 2.5–4.5)
PLATELET # BLD AUTO: 495 K/UL — HIGH (ref 150–400)
POTASSIUM SERPL-MCNC: 3.5 MMOL/L — SIGNIFICANT CHANGE UP (ref 3.5–5.3)
POTASSIUM SERPL-SCNC: 3.5 MMOL/L — SIGNIFICANT CHANGE UP (ref 3.5–5.3)
PROCALCITONIN SERPL-MCNC: 0.22 NG/ML — HIGH (ref 0.02–0.1)
PROT UR-MCNC: ABNORMAL
PROTHROM AB SERPL-ACNC: 18.6 SEC — HIGH (ref 10.6–13.6)
RBC # BLD: 4.63 M/UL — SIGNIFICANT CHANGE UP (ref 3.8–5.2)
RBC # FLD: 14.4 % — SIGNIFICANT CHANGE UP (ref 10.3–14.5)
RBC CASTS # UR COMP ASSIST: <5 /HPF — HIGH (ref 0–4)
SODIUM SERPL-SCNC: 137 MMOL/L — SIGNIFICANT CHANGE UP (ref 135–145)
SP GR SPEC: >1.05 (ref 1–1.05)
TRIGL SERPL-MCNC: 115 MG/DL — SIGNIFICANT CHANGE UP
TSH SERPL-MCNC: 2.52 UIU/ML — SIGNIFICANT CHANGE UP (ref 0.27–4.2)
UROBILINOGEN FLD QL: ABNORMAL
WBC # BLD: 12.73 K/UL — HIGH (ref 3.8–10.5)
WBC # FLD AUTO: 12.73 K/UL — HIGH (ref 3.8–10.5)
WBC UR QL: >10 /HPF — HIGH (ref 0–5)

## 2021-12-15 PROCEDURE — 99223 1ST HOSP IP/OBS HIGH 75: CPT | Mod: GC

## 2021-12-15 RX ORDER — ACETAMINOPHEN 500 MG
650 TABLET ORAL ONCE
Refills: 0 | Status: COMPLETED | OUTPATIENT
Start: 2021-12-15 | End: 2021-12-15

## 2021-12-15 RX ADMIN — HEPARIN SODIUM 2200 UNIT(S)/HR: 5000 INJECTION INTRAVENOUS; SUBCUTANEOUS at 11:08

## 2021-12-15 RX ADMIN — HEPARIN SODIUM 2200 UNIT(S)/HR: 5000 INJECTION INTRAVENOUS; SUBCUTANEOUS at 04:33

## 2021-12-15 RX ADMIN — Medication 650 MILLIGRAM(S): at 21:11

## 2021-12-15 RX ADMIN — Medication 400 MILLIGRAM(S): at 00:03

## 2021-12-15 RX ADMIN — Medication 650 MILLIGRAM(S): at 22:05

## 2021-12-15 NOTE — H&P ADULT - HISTORY OF PRESENT ILLNESS
Patient is a 50 y/o F with no significant PMHx who comes to the ED for R right lower ribcage and right clavicle pain for the past 3 days. Patient came in 2 days ago for similar symptoms. Xray from 2 days ago w/o msk findings. Patient describes the pain as a 10/10 stabbing/pressure on her R clavicle and ribcage. She reports that tylenol and warm compress helps relieve her pain. Patient with incidental findings to be COVID Positive and with PE today.  Patient denies fever, cough, sob, abdominal pain, n/v, change in urine or bowel, headache, SOB, abdominal pain, nausea, vomiting, recent travel, leg swelling, smoking, trauma.     ED Course: Vitals on arrival: 99.1F, HR: 100, BP:98/58, RR: 17, SPO2: 98%.CXR: Bibasilar opacities, right greater than left, as noted. Differential   diagnosis includes linear atelectasis or infection.Small right pleural effusion. Patient is a 52 y/o F with no significant PMHx who comes to the ED for R right lower ribcage and right clavicle pain for the past 3 days. Patient came in 2 days ago for similar symptoms. Xray from 2 days ago w/o msk findings. Patient describes the pain as a 10/10 stabbing/pressure on her R clavicle and ribcage. She reports that tylenol and warm compress helps relieve her pain. Patient with incidental findings to be COVID Positive and with PE today.  Patient denies fever, cough, sob, abdominal pain, n/v, change in urine or bowel, headache, SOB, abdominal pain, nausea, vomiting, recent travel, leg swelling, smoking, trauma.     ED Course: Vitals on arrival: 99.1F, HR: 100, BP:98/58, RR: 17, SPO2: 98%. EKG: Normal Sinus Rhythm HR 96bpm. TWI: v1. QTC:452. CXR: Bibasilar opacities, right greater than left, as noted. Differential diagnosis includes linear atelectasis or infection. Small right pleural effusion.  R Shoulder Xray: no emergent findings.  CTA Chest: Acute PE. UA: Positive with mild leukocytosis, positive nitrites, large wbc, many bacteria. While in the ED, patient was treated with 975mg Tylenol POx1, Valium 5mg POx1, Motrin 400mg POx2.  Patient is a 52 y/o F with no significant PMHx who comes to the ED for R lower ribcage and right clavicle pain for the past 3 days. Patient came in 2 days ago for similar symptoms. Xray from 2 days ago w/o msk findings. Patient describes the pain as a 10/10 stabbing/pressure on her R clavicle and ribcage. She reports that tylenol and warm compress helps relieve her pain. Patient with incidental findings to be COVID Positive and with PE today.  Patient denies fever, cough, sob, abdominal pain, n/v, change in urine or bowel, headache, SOB, abdominal pain, nausea, vomiting, recent travel, leg swelling, smoking, trauma.     ED Course: Vitals on arrival: 99.1F, HR: 100, BP:98/58, RR: 17, SPO2: 98%. EKG: Normal Sinus Rhythm HR 96bpm. TWI: v1. QTC:452. CXR: Bibasilar opacities, right greater than left, as noted. Differential diagnosis includes linear atelectasis or infection. Small right pleural effusion.  R Shoulder Xray: no emergent findings.  CTA Chest: Acute PE. UA: Positive with mild leukocytosis, positive nitrites, large wbc, many bacteria. While in the ED, patient was treated with 975mg Tylenol POx1, Valium 5mg POx1, Motrin 400mg POx2.

## 2021-12-15 NOTE — H&P ADULT - NSHPREVIEWOFSYSTEMS_GEN_ALL_CORE
Constitutional Symptoms: No weakness, fevers, chills, weight loss  Eyes: No visual changes, headache, eye pain, double vision  Ears, Nose, Mouth, Throat: No runny nose, sinus pain, ear pain, tinnitus, sore throat, dysphagia, odynophagia  Cardiovascular: No chest pain, palpitations, edema  Respiratory: No cough, wheezing, hemoptysis, shortness of breath  Gastrointestinal: No abdominal pain, dysphagia, anorexia, nausea/vomiting, diarrhea/constipation, hematemesis, BRBPR, melena  Genitourinary: No dysuria, frequency, hematuria  Musculoskeletal: +mild TTP of R Trapezius, Passive ROM of R shoulder  Skin: No pruritus, rashes, lesions, wounds  Neurologic:  No seizures, headache, paraesthesia, numbness, limb weakness  Psychiatric: No depression, anxiety, difficulty concentrating, anhedonia, lack of energy  Endocrine: No heat/cold intolerance, mood swings, sweats, polydipsia, polyuria  Hematologic/lymphatic: No purpura, petechia, prolonged or excessive bleeding after dental extraction / injury, use of anticoagulant and antiplatelet drugs (including aspirin)  Allergic/Immunologic: No anaphylaxis, allergic response to materials, foods, animals    Positives and pertinent negatives noted and all other systems negative.

## 2021-12-15 NOTE — H&P ADULT - ASSESSMENT
Patient is a 52 y/o F with no significant PMHx who comes to the ED for R right lower ribcage and right clavicle pain for the past 3 days. Incidentally found to have PE and COVID +. Admitted to medicine for further workup Patient is a 52 y/o F with no significant PMHx who comes to the ED for R lower ribcage and right clavicle pain for the past 3 days. Incidentally found to have PE and COVID +. Admitted to medicine for further workup

## 2021-12-15 NOTE — H&P ADULT - PROBLEM SELECTOR PLAN 4
-DVT Prophylaxis: Full Anticoagulation heparin Drip  -Diet: Regular(No seafood)  -Activity: Ambulate with assistance - IV antibitics-Rocephin 1 g daily  - Urine and blood cultures  - Pain control  - Tylenol prn fever  cbc, cmp, mg, phos - Urine cultures ordered  - Hold off on Abx as patient is asymptomatic and UA with epithelial cells  - Pain control  - Tylenol prn fever

## 2021-12-15 NOTE — PATIENT PROFILE ADULT - FALL HARM RISK - UNIVERSAL INTERVENTIONS
Bed in lowest position, wheels locked, appropriate side rails in place/Call bell, personal items and telephone in reach/Instruct patient to call for assistance before getting out of bed or chair/Non-slip footwear when patient is out of bed/[place] to call system/Physically safe environment - no spills, clutter or unnecessary equipment/Purposeful Proactive Rounding/Room/bathroom lighting operational, light cord in reach

## 2021-12-15 NOTE — H&P ADULT - NSHPPHYSICALEXAM_GEN_ALL_CORE
Vital Signs Last 24 Hrs  T(C): 37.3 (14 Dec 2021 22:15), Max: 37.3 (14 Dec 2021 12:32)  T(F): 99.1 (14 Dec 2021 22:15), Max: 99.1 (14 Dec 2021 12:32)  HR: 110 (14 Dec 2021 22:15) (95 - 110)  BP: 129/85 (14 Dec 2021 22:15) (98/58 - 129/85)  RR: 18 (14 Dec 2021 22:15) (17 - 18)  SpO2: 100% (14 Dec 2021 22:15) (98% - 100%)    PHYSICAL EXAM:  General: Awake and alert.  No acute distress.  Head: Normocephalic, atraumatic.    Eyes: PERRL.  EOMI.  No scleral icterus.  No conjunctival pallor.  Mouth: Moist MM.  No oropharyngeal exudates.    Neck: Supple.  Full range of motion.  No JVD.  No LAD.  No thyromegaly.  Trachea midline.    Heart: RRR.  Normal S1 and S2.  No murmurs, rubs, or gallops.  No LE edema b/l.   Lungs: Nonlabored breathing.  Good inspiratory effort.  CTAB.  No wheezes, crackles, or rhonchi.    Abdomen: BS+, soft, NT/ND.  No hepatomegaly.   Skin: Warm and dry.  No rashes.  Extremities: No cyanosis.  2+ peripheral pulses b/l.  Musculoskeletal: + mild TTP of R Trapezius, Passive ROM of RUE  Neuro: A&Ox4.  CN II-XII intact.  5/5 motor strength in UE and LE b/l.  Tactile sensation intact in UE and LE b/l.  Cerebellar function intact as assessed by finger-to-nose test.

## 2021-12-15 NOTE — H&P ADULT - PROBLEM SELECTOR PLAN 2
- AHRF likely 2/2 COVID infection  - Currently satting ___ on ____  - CXR:  - CT Chest:   - COVID/Full RVP panel pending  - currently satting 98% on RA. Will hold off on  Dexamethasone and Remdesivir for now  - Ferritin, LDH, CRP, ESR, Procalcitonin, D-dimer, Troponin ordered w/ AM labs  - supplemental O2 PRN  - PRN Tylenol/Ibuprofen for fever > 100.4  - Incentive spirometry  - Contact, airborne, droplet isolation precautions  - Monitor respiratory status closely - currently satting 98% on RA. Will hold off on  Dexamethasone and Remdesivir for now  - Ferritin, LDH, CRP, ESR, Procalcitonin, D-dimer, Troponin ordered w/ AM labs  - supplemental O2 PRN  - PRN Tylenol/Ibuprofen for fever > 100.4  - Incentive spirometry  - Contact, airborne, droplet isolation precautions  - Monitor respiratory status closely - currently satting 98% on RA. Will hold off on  Dexamethasone and Remdesivir for now  - Ferritin, LDH, CRP, ESR, Procalcitonin, D-dimer, Troponin ordered w/ AM labs  - supplemental O2 PRN  - PRN Tylenol/Ibuprofen for fever > 100.4  - Incentive spirometry  - Contact, airborne, droplet isolation precautions  - Monitor respiratory status closely  - Chest PT/ Prone PRN

## 2021-12-15 NOTE — H&P ADULT - NSICDXFAMILYHX_GEN_ALL_CORE_FT
FAMILY HISTORY:  Mother  Still living? Unknown  Family hx of colon cancer, Age at diagnosis: Age Unknown

## 2021-12-15 NOTE — H&P ADULT - ATTENDING COMMENTS
50 yo f with PE/pulm infarct possibly stemming from Sars cov-2 infection. Pt non-hypoxic. c/w heparin gtt, transition to oral agent, oupt Hem followup

## 2021-12-15 NOTE — H&P ADULT - PROBLEM SELECTOR PLAN 5
-DVT Prophylaxis: Full Anticoagulation heparin Drip  -Diet: Regular(No seafood)  -Activity: Ambulate with assistance

## 2021-12-15 NOTE — H&P ADULT - NSHPSOCIALHISTORY_GEN_ALL_CORE
Tobacco Usage:  (x) never smoked   ( ) former smoker  ( ) current smoker; Packs per day:   Alcohol Usage: (x) none  ( ) occasional ( ) 2-3 times a week ( ) daily; Last drink:   Recreational drugs (x) None  Lives with kids  Ambulates without assistance  Denies Recent travel

## 2021-12-15 NOTE — H&P ADULT - NSHPLABSRESULTS_GEN_ALL_CORE
EKG: Normal Sinus Rhythm HR 96bpm. TWI: v1. QTC:452    Labs:                        11.7   12.73 )-----------( 495      ( 15 Dec 2021 04:04 )             37.9     12-15    137  |  100  |  12  ----------------------------<  159<H>  3.5   |  24  |  0.80    Ca    8.9      15 Dec 2021 04:04  Phos  2.9     12-15  Mg     2.20     12-15    TPro  8.0  /  Alb  3.7  /  TBili  0.7  /  DBili  x   /  AST  31  /  ALT  45<H>  /  AlkPhos  101  12-14    PT/INR: PT: 18.6 sec | INR: 1.67 ratio (12-15-21 @ 04:04)  PTT: 99.1 sec (12-15-21 @ 04:04)  PT/INR: PT: 19.3 sec | INR: 1.72 ratio (12-14-21 @ 20:12)  PTT: 31.5 sec (12-14-21 @ 20:12)    CARDIAC ENZYMES:  Troponin T, High Sensitivity: 14 ng/L (12-14-21 @ 16:43)  Troponin T, High Sensitivity: 14 ng/L (12-12-21 @ 21:08)    Serum Pro-Brain Natriuretic Peptide : 16 pg/mL (12-14-21 @ 16:43)     Urinanalysis Basic (12-15-21 @ 05:07):  Color: Yellow / Appearance: Clear / SG: >1.050 / pH: x  Gluc: x / Ketone: Negative / Bili: Negative / Urobili: 3 mg/dL  Blood: x / Protein: 100 mg/dL / Nitrite: Positive  Leuk Esterase: Small / RBC: <5 / WBC: >10  Sq Epi: x / Non Sq Epi: x / Bacteria: Many      CAPILLARY BLOOD GLUCOSE:      COVID-19/Full RVP Panel:  Detected (12-14-21 @ 17:15)        RADIOLOGY  CXR: FINDINGS:  The heart size is similar.  Low lung volumes. Bilateral predominantly lower lung field opacities,   increased on the right but decreased on the left compared with 12/12/2021.  There is no pneumothorax. Small right pleural effusion.    IMPRESSION:  Bibasilar opacities, right greater than left, as noted. Differential   diagnosis includes linear atelectasis or infection.  Small right pleural effusion.    --- End of Report ---    R Shoulder XRay: no emergent findings    CTA Chest: IMPRESSION:    1.  There is acute pulmonary embolus in the lobar artery extending into   the right middle and lower lobar arteries and the segmental and   subsegmental divisions of the right lower lobe.  2.  The right lower lobe opacities and subtle areas of hypoenhancement   are concerning for pulmonary infarction.  3.  Additional subcentimeter central and peripheral opacities throughout   the left upper and lower lobes are of unclear etiology.  4.  Dr. Hernandez discussed the findings with Dr. Pollock on 7:27 PM 12/14/2021.    Readback was obtained.

## 2021-12-15 NOTE — H&P ADULT - PROBLEM SELECTOR PLAN 1
- CT with Filling defects in the bilateral lower lobe pulmonary artery branches  - Currently saturating well on room air  - TTE ordered  - Monitor vitals closely  - Continue heparin drip for anticoagulation - CT with findings->There is acute pulmonary embolus in the lobar artery extending into the right middle and lower lobar arteries and the segmental and   subsegmental divisions of the right lower lobe.  - Currently saturating well on room air  - TTE ordered  - Monitor vitals closely  - Continue heparin drip for anticoagulation

## 2021-12-16 LAB
ANION GAP SERPL CALC-SCNC: 11 MMOL/L — SIGNIFICANT CHANGE UP (ref 7–14)
APTT BLD: 65.4 SEC — HIGH (ref 27–36.3)
BUN SERPL-MCNC: 10 MG/DL — SIGNIFICANT CHANGE UP (ref 7–23)
CALCIUM SERPL-MCNC: 8.8 MG/DL — SIGNIFICANT CHANGE UP (ref 8.4–10.5)
CHLORIDE SERPL-SCNC: 101 MMOL/L — SIGNIFICANT CHANGE UP (ref 98–107)
CO2 SERPL-SCNC: 25 MMOL/L — SIGNIFICANT CHANGE UP (ref 22–31)
CREAT SERPL-MCNC: 0.82 MG/DL — SIGNIFICANT CHANGE UP (ref 0.5–1.3)
GLUCOSE SERPL-MCNC: 153 MG/DL — HIGH (ref 70–99)
HCT VFR BLD CALC: 34.4 % — LOW (ref 34.5–45)
HGB BLD-MCNC: 10.8 G/DL — LOW (ref 11.5–15.5)
MAGNESIUM SERPL-MCNC: 2.2 MG/DL — SIGNIFICANT CHANGE UP (ref 1.6–2.6)
MCHC RBC-ENTMCNC: 25.3 PG — LOW (ref 27–34)
MCHC RBC-ENTMCNC: 31.4 GM/DL — LOW (ref 32–36)
MCV RBC AUTO: 80.6 FL — SIGNIFICANT CHANGE UP (ref 80–100)
NRBC # BLD: 0 /100 WBCS — SIGNIFICANT CHANGE UP
NRBC # FLD: 0 K/UL — SIGNIFICANT CHANGE UP
PHOSPHATE SERPL-MCNC: 3 MG/DL — SIGNIFICANT CHANGE UP (ref 2.5–4.5)
PLATELET # BLD AUTO: 480 K/UL — HIGH (ref 150–400)
POTASSIUM SERPL-MCNC: 3.6 MMOL/L — SIGNIFICANT CHANGE UP (ref 3.5–5.3)
POTASSIUM SERPL-SCNC: 3.6 MMOL/L — SIGNIFICANT CHANGE UP (ref 3.5–5.3)
RBC # BLD: 4.27 M/UL — SIGNIFICANT CHANGE UP (ref 3.8–5.2)
RBC # FLD: 14.5 % — SIGNIFICANT CHANGE UP (ref 10.3–14.5)
SODIUM SERPL-SCNC: 137 MMOL/L — SIGNIFICANT CHANGE UP (ref 135–145)
WBC # BLD: 9.61 K/UL — SIGNIFICANT CHANGE UP (ref 3.8–10.5)
WBC # FLD AUTO: 9.61 K/UL — SIGNIFICANT CHANGE UP (ref 3.8–10.5)

## 2021-12-16 RX ORDER — ACETAMINOPHEN 500 MG
650 TABLET ORAL ONCE
Refills: 0 | Status: COMPLETED | OUTPATIENT
Start: 2021-12-16 | End: 2021-12-16

## 2021-12-16 RX ORDER — ACETAMINOPHEN 500 MG
650 TABLET ORAL EVERY 6 HOURS
Refills: 0 | Status: DISCONTINUED | OUTPATIENT
Start: 2021-12-16 | End: 2021-12-20

## 2021-12-16 RX ADMIN — Medication 650 MILLIGRAM(S): at 04:32

## 2021-12-16 RX ADMIN — Medication 100 MILLIGRAM(S): at 22:38

## 2021-12-16 RX ADMIN — HEPARIN SODIUM 2200 UNIT(S)/HR: 5000 INJECTION INTRAVENOUS; SUBCUTANEOUS at 19:24

## 2021-12-16 RX ADMIN — Medication 650 MILLIGRAM(S): at 09:21

## 2021-12-16 RX ADMIN — HEPARIN SODIUM 2200 UNIT(S)/HR: 5000 INJECTION INTRAVENOUS; SUBCUTANEOUS at 06:53

## 2021-12-16 RX ADMIN — Medication 650 MILLIGRAM(S): at 14:42

## 2021-12-16 RX ADMIN — Medication 650 MILLIGRAM(S): at 15:52

## 2021-12-16 RX ADMIN — Medication 650 MILLIGRAM(S): at 08:39

## 2021-12-16 RX ADMIN — HEPARIN SODIUM 2200 UNIT(S)/HR: 5000 INJECTION INTRAVENOUS; SUBCUTANEOUS at 22:04

## 2021-12-16 RX ADMIN — Medication 650 MILLIGRAM(S): at 22:38

## 2021-12-16 RX ADMIN — Medication 650 MILLIGRAM(S): at 23:08

## 2021-12-16 RX ADMIN — Medication 650 MILLIGRAM(S): at 04:03

## 2021-12-17 ENCOUNTER — TRANSCRIPTION ENCOUNTER (OUTPATIENT)
Age: 51
End: 2021-12-17

## 2021-12-17 LAB
ANION GAP SERPL CALC-SCNC: 14 MMOL/L — SIGNIFICANT CHANGE UP (ref 7–14)
APTT BLD: 51 SEC — HIGH (ref 27–36.3)
APTT BLD: 54.1 SEC — HIGH (ref 27–36.3)
BUN SERPL-MCNC: 10 MG/DL — SIGNIFICANT CHANGE UP (ref 7–23)
CALCIUM SERPL-MCNC: 9.5 MG/DL — SIGNIFICANT CHANGE UP (ref 8.4–10.5)
CHLORIDE SERPL-SCNC: 97 MMOL/L — LOW (ref 98–107)
CO2 SERPL-SCNC: 26 MMOL/L — SIGNIFICANT CHANGE UP (ref 22–31)
CREAT SERPL-MCNC: 0.86 MG/DL — SIGNIFICANT CHANGE UP (ref 0.5–1.3)
GLUCOSE SERPL-MCNC: 109 MG/DL — HIGH (ref 70–99)
HCT VFR BLD CALC: 37.9 % — SIGNIFICANT CHANGE UP (ref 34.5–45)
HGB BLD-MCNC: 11.8 G/DL — SIGNIFICANT CHANGE UP (ref 11.5–15.5)
MAGNESIUM SERPL-MCNC: 2.3 MG/DL — SIGNIFICANT CHANGE UP (ref 1.6–2.6)
MCHC RBC-ENTMCNC: 25.3 PG — LOW (ref 27–34)
MCHC RBC-ENTMCNC: 31.1 GM/DL — LOW (ref 32–36)
MCV RBC AUTO: 81.3 FL — SIGNIFICANT CHANGE UP (ref 80–100)
NRBC # BLD: 0 /100 WBCS — SIGNIFICANT CHANGE UP
NRBC # FLD: 0 K/UL — SIGNIFICANT CHANGE UP
PHOSPHATE SERPL-MCNC: 3.2 MG/DL — SIGNIFICANT CHANGE UP (ref 2.5–4.5)
PLATELET # BLD AUTO: 552 K/UL — HIGH (ref 150–400)
POTASSIUM SERPL-MCNC: 3.9 MMOL/L — SIGNIFICANT CHANGE UP (ref 3.5–5.3)
POTASSIUM SERPL-SCNC: 3.9 MMOL/L — SIGNIFICANT CHANGE UP (ref 3.5–5.3)
RBC # BLD: 4.66 M/UL — SIGNIFICANT CHANGE UP (ref 3.8–5.2)
RBC # FLD: 14.7 % — HIGH (ref 10.3–14.5)
SODIUM SERPL-SCNC: 137 MMOL/L — SIGNIFICANT CHANGE UP (ref 135–145)
WBC # BLD: 8.79 K/UL — SIGNIFICANT CHANGE UP (ref 3.8–10.5)
WBC # FLD AUTO: 8.79 K/UL — SIGNIFICANT CHANGE UP (ref 3.8–10.5)

## 2021-12-17 PROCEDURE — 93306 TTE W/DOPPLER COMPLETE: CPT | Mod: 26

## 2021-12-17 RX ORDER — APIXABAN 2.5 MG/1
1 TABLET, FILM COATED ORAL
Qty: 1 | Refills: 0
Start: 2021-12-17

## 2021-12-17 RX ADMIN — Medication 200 MILLIGRAM(S): at 05:35

## 2021-12-17 RX ADMIN — Medication 100 MILLIGRAM(S): at 20:08

## 2021-12-17 RX ADMIN — HEPARIN SODIUM 2800 UNIT(S)/HR: 5000 INJECTION INTRAVENOUS; SUBCUTANEOUS at 19:01

## 2021-12-17 RX ADMIN — Medication 200 MILLIGRAM(S): at 20:08

## 2021-12-17 RX ADMIN — HEPARIN SODIUM 2200 UNIT(S)/HR: 5000 INJECTION INTRAVENOUS; SUBCUTANEOUS at 07:12

## 2021-12-17 RX ADMIN — HEPARIN SODIUM 2800 UNIT(S)/HR: 5000 INJECTION INTRAVENOUS; SUBCUTANEOUS at 17:20

## 2021-12-17 RX ADMIN — HEPARIN SODIUM 2500 UNIT(S)/HR: 5000 INJECTION INTRAVENOUS; SUBCUTANEOUS at 08:41

## 2021-12-17 RX ADMIN — HEPARIN SODIUM 5000 UNIT(S): 5000 INJECTION INTRAVENOUS; SUBCUTANEOUS at 17:21

## 2021-12-17 RX ADMIN — Medication 100 MILLIGRAM(S): at 05:35

## 2021-12-17 NOTE — DISCHARGE NOTE PROVIDER - NSDCCPCAREPLAN_GEN_ALL_CORE_FT
PRINCIPAL DISCHARGE DIAGNOSIS  Diagnosis: Pulmonary embolism and infarction  Assessment and Plan of Treatment: CT -acute PE in the lobar artery extending into the right middle and lower lobar arteries and the segmental and subsegmental divisions of the right lower lobe.  - TTE ordered : NL EF  unable to eval R side, Normal LV EF 67%  - LE dopplers negative for DVT on 12/19, treated w/ Heparin drip for anticoagulation which was changed to  Eliquis on 12/20/2021  Eliquis take 2 tab(s) orally 2 x day for 7 days   then 1 tab 2 x day =thereafter  Please make a follow up appt with your PCP      SECONDARY DISCHARGE DIAGNOSES  Diagnosis: Urinary tract infection  Assessment and Plan of Treatment: Hold off on Abx as patient is asymptomatic and UA with epithelial cells. pt with hx of urinary retention , self catheterize post surgical intervention for benign lesion    Diagnosis: Right shoulder pain  Assessment and Plan of Treatment: -Xray of R Shoulder-> no emergent findings  -take Tylenol and warm compress for pain as needed

## 2021-12-17 NOTE — DISCHARGE NOTE PROVIDER - NSDCMRMEDTOKEN_GEN_ALL_CORE_FT
Eliquis Starter Pack for Treatment of DVT and PE 5 mg oral tablet: 1 tab(s) orally once a day     AS DIRECTED  Valium 5 mg oral tablet: 1 tab(s) orally 2 times a day, As Needed -for muscle spasm MDD:4   benzonatate 100 mg oral capsule: 1 cap(s) orally every 8 hours, As Needed -Cough   Eliquis Starter Pack for Treatment of DVT and PE 5 mg oral tablet: 2 tab(s) orally 2 x day for 7 days until 12/27  then 1 tab 2 x day =thereafter

## 2021-12-17 NOTE — PROVIDER CONTACT NOTE (OTHER) - ACTION/TREATMENT ORDERED:
ACP Navya Ma made aware, per ACP continue hep gtt and continue to monitor at this time, no other intervention

## 2021-12-17 NOTE — DISCHARGE NOTE PROVIDER - HOSPITAL COURSE
50 y/o F no MHx, p/w R lower ribcage and right clavicle pain for the past 3 days. Incidentally found to have PE and COVID +.   - CT -acute PE in the lobar artery extending into the right middle and lower lobar arteries and the segmental and subsegmental divisions of the right lower lobe.  - TTE ordered : NL EF  unable to eval R side, Normal LV EF 67%  - check O2 sat on RA   - LE dopplers negative for DVT on 12/19   - Started on heparin drip for anticoagulation changed to  Eliquis upon dc   - PE likley sec to covid however pt with fam hx of colon ca : will need age appropriate screening as op     COVID-19- currently satting 98% on RA- hold off on  Dexamethasone and Remdesivir for now  Right shoulder pain-Xray of R Shoulder-> no emergent findings. PRN Tylenol and warm compress for pain.    Urinary tract infection- Hold off on Abx as patient is asymptomatic and UA with epithelial cells. pt with hx of urinary retention , self catheterize post surgical intervention for benign lesion     On 12/20 pt is optimized medically for dc

## 2021-12-18 LAB
ANION GAP SERPL CALC-SCNC: 4 MMOL/L — LOW (ref 7–14)
APTT BLD: 67.4 SEC — HIGH (ref 27–36.3)
APTT BLD: 97.6 SEC — HIGH (ref 27–36.3)
BUN SERPL-MCNC: 8 MG/DL — SIGNIFICANT CHANGE UP (ref 7–23)
CALCIUM SERPL-MCNC: 9.5 MG/DL — SIGNIFICANT CHANGE UP (ref 8.4–10.5)
CHLORIDE SERPL-SCNC: 102 MMOL/L — SIGNIFICANT CHANGE UP (ref 98–107)
CO2 SERPL-SCNC: 26 MMOL/L — SIGNIFICANT CHANGE UP (ref 22–31)
CREAT SERPL-MCNC: 0.88 MG/DL — SIGNIFICANT CHANGE UP (ref 0.5–1.3)
GLUCOSE SERPL-MCNC: 111 MG/DL — HIGH (ref 70–99)
HCT VFR BLD CALC: 34 % — LOW (ref 34.5–45)
HGB BLD-MCNC: 10.4 G/DL — LOW (ref 11.5–15.5)
MCHC RBC-ENTMCNC: 24.8 PG — LOW (ref 27–34)
MCHC RBC-ENTMCNC: 30.6 GM/DL — LOW (ref 32–36)
MCV RBC AUTO: 81.1 FL — SIGNIFICANT CHANGE UP (ref 80–100)
NRBC # BLD: 0 /100 WBCS — SIGNIFICANT CHANGE UP
NRBC # FLD: 0 K/UL — SIGNIFICANT CHANGE UP
PLATELET # BLD AUTO: 565 K/UL — HIGH (ref 150–400)
POTASSIUM SERPL-MCNC: 4 MMOL/L — SIGNIFICANT CHANGE UP (ref 3.5–5.3)
POTASSIUM SERPL-SCNC: 4 MMOL/L — SIGNIFICANT CHANGE UP (ref 3.5–5.3)
RBC # BLD: 4.19 M/UL — SIGNIFICANT CHANGE UP (ref 3.8–5.2)
RBC # FLD: 14.6 % — HIGH (ref 10.3–14.5)
SODIUM SERPL-SCNC: 132 MMOL/L — LOW (ref 135–145)
WBC # BLD: 10.33 K/UL — SIGNIFICANT CHANGE UP (ref 3.8–10.5)
WBC # FLD AUTO: 10.33 K/UL — SIGNIFICANT CHANGE UP (ref 3.8–10.5)

## 2021-12-18 RX ORDER — CYCLOBENZAPRINE HYDROCHLORIDE 10 MG/1
5 TABLET, FILM COATED ORAL ONCE
Refills: 0 | Status: COMPLETED | OUTPATIENT
Start: 2021-12-18 | End: 2021-12-18

## 2021-12-18 RX ADMIN — CYCLOBENZAPRINE HYDROCHLORIDE 5 MILLIGRAM(S): 10 TABLET, FILM COATED ORAL at 15:18

## 2021-12-18 RX ADMIN — HEPARIN SODIUM 2800 UNIT(S)/HR: 5000 INJECTION INTRAVENOUS; SUBCUTANEOUS at 11:17

## 2021-12-18 RX ADMIN — HEPARIN SODIUM 2800 UNIT(S)/HR: 5000 INJECTION INTRAVENOUS; SUBCUTANEOUS at 21:51

## 2021-12-18 RX ADMIN — HEPARIN SODIUM 2800 UNIT(S)/HR: 5000 INJECTION INTRAVENOUS; SUBCUTANEOUS at 02:36

## 2021-12-19 LAB
ANION GAP SERPL CALC-SCNC: 13 MMOL/L — SIGNIFICANT CHANGE UP (ref 7–14)
APTT BLD: 115.2 SEC — HIGH (ref 27–36.3)
APTT BLD: 124.3 SEC — CRITICAL HIGH (ref 27–36.3)
BUN SERPL-MCNC: 9 MG/DL — SIGNIFICANT CHANGE UP (ref 7–23)
CALCIUM SERPL-MCNC: 9.3 MG/DL — SIGNIFICANT CHANGE UP (ref 8.4–10.5)
CHLORIDE SERPL-SCNC: 98 MMOL/L — SIGNIFICANT CHANGE UP (ref 98–107)
CO2 SERPL-SCNC: 25 MMOL/L — SIGNIFICANT CHANGE UP (ref 22–31)
CREAT SERPL-MCNC: 0.83 MG/DL — SIGNIFICANT CHANGE UP (ref 0.5–1.3)
GLUCOSE SERPL-MCNC: 111 MG/DL — HIGH (ref 70–99)
HCT VFR BLD CALC: 34.9 % — SIGNIFICANT CHANGE UP (ref 34.5–45)
HGB BLD-MCNC: 10.6 G/DL — LOW (ref 11.5–15.5)
MCHC RBC-ENTMCNC: 24.8 PG — LOW (ref 27–34)
MCHC RBC-ENTMCNC: 30.4 GM/DL — LOW (ref 32–36)
MCV RBC AUTO: 81.5 FL — SIGNIFICANT CHANGE UP (ref 80–100)
NRBC # BLD: 0 /100 WBCS — SIGNIFICANT CHANGE UP
NRBC # FLD: 0 K/UL — SIGNIFICANT CHANGE UP
PLATELET # BLD AUTO: 536 K/UL — HIGH (ref 150–400)
POTASSIUM SERPL-MCNC: 4.1 MMOL/L — SIGNIFICANT CHANGE UP (ref 3.5–5.3)
POTASSIUM SERPL-SCNC: 4.1 MMOL/L — SIGNIFICANT CHANGE UP (ref 3.5–5.3)
RBC # BLD: 4.28 M/UL — SIGNIFICANT CHANGE UP (ref 3.8–5.2)
RBC # FLD: 14.8 % — HIGH (ref 10.3–14.5)
SODIUM SERPL-SCNC: 136 MMOL/L — SIGNIFICANT CHANGE UP (ref 135–145)
WBC # BLD: 8.7 K/UL — SIGNIFICANT CHANGE UP (ref 3.8–10.5)
WBC # FLD AUTO: 8.7 K/UL — SIGNIFICANT CHANGE UP (ref 3.8–10.5)

## 2021-12-19 PROCEDURE — 93970 EXTREMITY STUDY: CPT | Mod: 26

## 2021-12-19 RX ADMIN — HEPARIN SODIUM 2500 UNIT(S)/HR: 5000 INJECTION INTRAVENOUS; SUBCUTANEOUS at 08:54

## 2021-12-19 RX ADMIN — HEPARIN SODIUM 2800 UNIT(S)/HR: 5000 INJECTION INTRAVENOUS; SUBCUTANEOUS at 06:00

## 2021-12-19 RX ADMIN — HEPARIN SODIUM 2200 UNIT(S)/HR: 5000 INJECTION INTRAVENOUS; SUBCUTANEOUS at 17:08

## 2021-12-19 NOTE — PROGRESS NOTE ADULT - ASSESSMENT
52 y/o F with no significant PMHx who comes to the ED for R lower ribcage and right clavicle pain for the past 3 days.  found to have PE and COVID +.  Problem/Plan - 1:  ·  Problem: Pulmonary embolism associated with COVID-19.   ·  Plan: - CT with findings->There is acute pulmonary embolus in the lobar artery extending into the right middle and lower lobar arteries and the segmental and   subsegmental divisions of the right lower lobe.  - Currently saturating well on room air  - TTE ordered : NL EF .. unable to eval R side  - check O2 sat on RA   - check US doppler   - Monitor vitals closely  - Continue heparin drip for anticoagulation.: will change to  eliquis upon dc   - PE likley sec to covid however pt with fam hx of colon ca : will need age appropriate screening as op     Problem/Plan - 2:  ·  Problem: 2019 novel coronavirus disease (COVID-19).   ·  Plan: - currently satting 98% on RA. Will hold off on  Dexamethasone and Remdesivir for now  - Ferritin, LDH, CRP, ESR, Procalcitonin, D-dimer, Troponin   - supplemental O2 PRN  - PRN Tylenol/Ibuprofen for fever > 100.4  - Incentive spirometry  - Contact, airborne, droplet isolation precautions  - Monitor respiratory status closely  - check O2 on RA on exertion     Problem/Plan - 3:  ·  Problem: Right shoulder pain.   ·  Plan: -Xray of R Shoulder-> no emergent findings  -c/w PRN Tylenol and warm compress for pain.    Problem/Plan - 4:  ·  Problem: Urinary tract infection.   ·  Plan: - Urine cultures ordered  - Hold off on Abx as patient is asymptomatic and UA with epithelial cells  - Pain control  - Tylenol prn fever.  pt with hx of urinary retention , self catheterize post surgical intervention for benign lesion     Problem/Plan - 5:  ·  Problem: Need for prophylactic measure.   ·  Plan: -DVT Prophylaxis: Full Anticoagulation heparin Drip  -Diet: Regular(No seafood)  -Activity: Ambulate with assistance.  
 52 y/o F with no significant PMHx who comes to the ED for R lower ribcage and right clavicle pain for the past 3 days.  found to have PE and COVID +.  Problem/Plan - 1:  ·  Problem: Pulmonary embolism associated with COVID-19.   ·  Plan: - CT with findings->There is acute pulmonary embolus in the lobar artery extending into the right middle and lower lobar arteries and the segmental and   subsegmental divisions of the right lower lobe.  - Currently saturating well on room air  - TTE ordered : attempt to expedite   - check O2 sat on RA   - check US doppler   - Monitor vitals closely  - Continue heparin drip for anticoagulation.: will change to  eliquis upon dc   - PE likley sec to covid however pt with fam hx of colon ca : will need age appropriate screening as op     Problem/Plan - 2:  ·  Problem: 2019 novel coronavirus disease (COVID-19).   ·  Plan: - currently satting 98% on RA. Will hold off on  Dexamethasone and Remdesivir for now  - Ferritin, LDH, CRP, ESR, Procalcitonin, D-dimer, Troponin   - supplemental O2 PRN  - PRN Tylenol/Ibuprofen for fever > 100.4  - Incentive spirometry  - Contact, airborne, droplet isolation precautions  - Monitor respiratory status closely  - check O2 on RA on exertion     Problem/Plan - 3:  ·  Problem: Right shoulder pain.   ·  Plan: -Xray of R Shoulder-> no emergent findings  -c/w PRN Tylenol and warm compress for pain.    Problem/Plan - 4:  ·  Problem: Urinary tract infection.   ·  Plan: - Urine cultures ordered  - Hold off on Abx as patient is asymptomatic and UA with epithelial cells  - Pain control  - Tylenol prn fever.    Problem/Plan - 5:  ·  Problem: Need for prophylactic measure.   ·  Plan: -DVT Prophylaxis: Full Anticoagulation heparin Drip  -Diet: Regular(No seafood)  -Activity: Ambulate with assistance.  
 50 y/o F with no significant PMHx who comes to the ED for R lower ribcage and right clavicle pain for the past 3 days.  found to have PE and COVID +.  Problem/Plan - 1:  ·  Problem: Pulmonary embolism associated with COVID-19.   ·  Plan: - CT with findings->There is acute pulmonary embolus in the lobar artery extending into the right middle and lower lobar arteries and the segmental and   subsegmental divisions of the right lower lobe.  - Currently saturating well on room air  - TTE ordered  - Monitor vitals closely  - Continue heparin drip for anticoagulation.: will change to  eliquis upon dc       Problem/Plan - 2:  ·  Problem: 2019 novel coronavirus disease (COVID-19).   ·  Plan: - currently satting 98% on RA. Will hold off on  Dexamethasone and Remdesivir for now  - Ferritin, LDH, CRP, ESR, Procalcitonin, D-dimer, Troponin   - supplemental O2 PRN  - PRN Tylenol/Ibuprofen for fever > 100.4  - Incentive spirometry  - Contact, airborne, droplet isolation precautions  - Monitor respiratory status closely  - check O2 on RA on exertion     Problem/Plan - 3:  ·  Problem: Right shoulder pain.   ·  Plan: -Xray of R Shoulder-> no emergent findings  -c/w PRN Tylenol and warm compress for pain.    Problem/Plan - 4:  ·  Problem: Urinary tract infection.   ·  Plan: - Urine cultures ordered  - Hold off on Abx as patient is asymptomatic and UA with epithelial cells  - Pain control  - Tylenol prn fever.    Problem/Plan - 5:  ·  Problem: Need for prophylactic measure.   ·  Plan: -DVT Prophylaxis: Full Anticoagulation heparin Drip  -Diet: Regular(No seafood)  -Activity: Ambulate with assistance.  
 50 y/o F with no significant PMHx who comes to the ED for R lower ribcage and right clavicle pain for the past 3 days.  found to have PE and COVID +.  Problem/Plan - 1:  ·  Problem: Pulmonary embolism associated with COVID-19.   ·  Plan: - CT with findings->There is acute pulmonary embolus in the lobar artery extending into the right middle and lower lobar arteries and the segmental and   subsegmental divisions of the right lower lobe.  - Currently saturating well on room air  - TTE ordered : NL EF .. unable to eval R side  - check O2 sat on RA   - check US doppler (ordered 12/17/21)  - Monitor vitals closely  - Continue heparin drip for anticoagulation.: will change to  eliquis upon dc   - PE likley sec to covid however pt with fam hx of colon ca : will need age appropriate screening as op     Problem/Plan - 2:  ·  Problem: 2019 novel coronavirus disease (COVID-19).   ·  Plan: - currently satting 98% on RA. Will hold off on  Dexamethasone and Remdesivir for now  - Ferritin, LDH, CRP, ESR, Procalcitonin, D-dimer, Troponin   - supplemental O2 PRN  - PRN Tylenol/Ibuprofen for fever > 100.4  - Incentive spirometry  - Contact, airborne, droplet isolation precautions  - Monitor respiratory status closely  - check O2 on RA on exertion     Problem/Plan - 3:  ·  Problem: Right shoulder pain.   ·  Plan: -Xray of R Shoulder-> no emergent findings  -c/w PRN Tylenol and warm compress for pain.    Problem/Plan - 4:  ·  Problem: Urinary tract infection.   ·  Plan: - Urine cultures ordered  - Hold off on Abx as patient is asymptomatic and UA with epithelial cells  - Pain control  - Tylenol prn fever.  pt with hx of urinary retention , self catheterize post surgical intervention for benign lesion     Problem/Plan - 5:  ·  Problem: Need for prophylactic measure.   ·  Plan: -DVT Prophylaxis: Full Anticoagulation heparin Drip  -Diet: Regular(No seafood)  -Activity: Ambulate with assistance.  
 52 y/o F with no significant PMHx who comes to the ED for R lower ribcage and right clavicle pain for the past 3 days.  found to have PE and COVID +.  Problem/Plan - 1:  ·  Problem: Pulmonary embolism associated with COVID-19.   ·  Plan: - CT with findings->There is acute pulmonary embolus in the lobar artery extending into the right middle and lower lobar arteries and the segmental and   subsegmental divisions of the right lower lobe.  - Currently saturating well on room air  - TTE ordered  - Monitor vitals closely  - Continue heparin drip for anticoagulation.: will change to  eliquis upon dc       Problem/Plan - 2:  ·  Problem: 2019 novel coronavirus disease (COVID-19).   ·  Plan: - currently satting 98% on RA. Will hold off on  Dexamethasone and Remdesivir for now  - Ferritin, LDH, CRP, ESR, Procalcitonin, D-dimer, Troponin   - supplemental O2 PRN  - PRN Tylenol/Ibuprofen for fever > 100.4  - Incentive spirometry  - Contact, airborne, droplet isolation precautions  - Monitor respiratory status closely  - check O2 on RA on exertion     Problem/Plan - 3:  ·  Problem: Right shoulder pain.   ·  Plan: -Xray of R Shoulder-> no emergent findings  -c/w PRN Tylenol and warm compress for pain.    Problem/Plan - 4:  ·  Problem: Urinary tract infection.   ·  Plan: - Urine cultures ordered  - Hold off on Abx as patient is asymptomatic and UA with epithelial cells  - Pain control  - Tylenol prn fever.    Problem/Plan - 5:  ·  Problem: Need for prophylactic measure.   ·  Plan: -DVT Prophylaxis: Full Anticoagulation heparin Drip  -Diet: Regular(No seafood)  -Activity: Ambulate with assistance.

## 2021-12-20 ENCOUNTER — TRANSCRIPTION ENCOUNTER (OUTPATIENT)
Age: 51
End: 2021-12-20

## 2021-12-20 VITALS
SYSTOLIC BLOOD PRESSURE: 121 MMHG | TEMPERATURE: 98 F | OXYGEN SATURATION: 97 % | DIASTOLIC BLOOD PRESSURE: 68 MMHG | HEART RATE: 99 BPM

## 2021-12-20 PROBLEM — Z00.00 ENCOUNTER FOR PREVENTIVE HEALTH EXAMINATION: Status: ACTIVE | Noted: 2021-12-20

## 2021-12-20 LAB
ANION GAP SERPL CALC-SCNC: 11 MMOL/L — SIGNIFICANT CHANGE UP (ref 7–14)
APTT BLD: 87.5 SEC — HIGH (ref 27–36.3)
APTT BLD: 91.3 SEC — HIGH (ref 27–36.3)
BUN SERPL-MCNC: 9 MG/DL — SIGNIFICANT CHANGE UP (ref 7–23)
CALCIUM SERPL-MCNC: 9.4 MG/DL — SIGNIFICANT CHANGE UP (ref 8.4–10.5)
CHLORIDE SERPL-SCNC: 100 MMOL/L — SIGNIFICANT CHANGE UP (ref 98–107)
CO2 SERPL-SCNC: 26 MMOL/L — SIGNIFICANT CHANGE UP (ref 22–31)
CREAT SERPL-MCNC: 0.85 MG/DL — SIGNIFICANT CHANGE UP (ref 0.5–1.3)
GLUCOSE SERPL-MCNC: 124 MG/DL — HIGH (ref 70–99)
HCT VFR BLD CALC: 34.5 % — SIGNIFICANT CHANGE UP (ref 34.5–45)
HGB BLD-MCNC: 10.6 G/DL — LOW (ref 11.5–15.5)
MCHC RBC-ENTMCNC: 24.9 PG — LOW (ref 27–34)
MCHC RBC-ENTMCNC: 30.7 GM/DL — LOW (ref 32–36)
MCV RBC AUTO: 81 FL — SIGNIFICANT CHANGE UP (ref 80–100)
NRBC # BLD: 0 /100 WBCS — SIGNIFICANT CHANGE UP
NRBC # FLD: 0 K/UL — SIGNIFICANT CHANGE UP
PLATELET # BLD AUTO: 563 K/UL — HIGH (ref 150–400)
POTASSIUM SERPL-MCNC: 4.3 MMOL/L — SIGNIFICANT CHANGE UP (ref 3.5–5.3)
POTASSIUM SERPL-SCNC: 4.3 MMOL/L — SIGNIFICANT CHANGE UP (ref 3.5–5.3)
RBC # BLD: 4.26 M/UL — SIGNIFICANT CHANGE UP (ref 3.8–5.2)
RBC # FLD: 14.7 % — HIGH (ref 10.3–14.5)
SODIUM SERPL-SCNC: 137 MMOL/L — SIGNIFICANT CHANGE UP (ref 135–145)
WBC # BLD: 8.91 K/UL — SIGNIFICANT CHANGE UP (ref 3.8–10.5)
WBC # FLD AUTO: 8.91 K/UL — SIGNIFICANT CHANGE UP (ref 3.8–10.5)

## 2021-12-20 RX ORDER — APIXABAN 2.5 MG/1
1 TABLET, FILM COATED ORAL
Qty: 1 | Refills: 0
Start: 2021-12-20

## 2021-12-20 RX ORDER — APIXABAN 2.5 MG/1
10 TABLET, FILM COATED ORAL EVERY 12 HOURS
Refills: 0 | Status: DISCONTINUED | OUTPATIENT
Start: 2021-12-20 | End: 2021-12-20

## 2021-12-20 RX ORDER — APIXABAN 2.5 MG/1
2 TABLET, FILM COATED ORAL
Qty: 1 | Refills: 0
Start: 2021-12-20 | End: 2022-01-18

## 2021-12-20 RX ADMIN — HEPARIN SODIUM 2200 UNIT(S)/HR: 5000 INJECTION INTRAVENOUS; SUBCUTANEOUS at 00:41

## 2021-12-20 RX ADMIN — APIXABAN 10 MILLIGRAM(S): 2.5 TABLET, FILM COATED ORAL at 10:33

## 2021-12-20 RX ADMIN — HEPARIN SODIUM 2200 UNIT(S)/HR: 5000 INJECTION INTRAVENOUS; SUBCUTANEOUS at 08:15

## 2021-12-20 NOTE — PROGRESS NOTE ADULT - SUBJECTIVE AND OBJECTIVE BOX
Date of service: 12-16-21 @ 22:54      Patient is a 51y old  Female who presents with a chief complaint of PE (15 Dec 2021 14:59)                                                               INTERVAL HPI/OVERNIGHT EVENTS:    REVIEW OF SYSTEMS:     CONSTITUTIONAL: No weakness, fevers or chills  RESPIRATORY: No cough, wheezing,  No shortness of breath  CARDIOVASCULAR: No chest pain or palpitations  GASTROINTESTINAL: No abdominal pain  . No nausea, vomiting, or hematemesis; No diarrhea or constipation. No melena or hematochezia.  GENITOURINARY: No dysuria, frequency or hematuria  NEUROLOGICAL: No numbness or weakness                                                                                                                                                                                                                                                                                Medications:  MEDICATIONS  (STANDING):  heparin  Infusion.  Unit(s)/Hr (22 mL/Hr) IV Continuous <Continuous>    MEDICATIONS  (PRN):  acetaminophen     Tablet .. 650 milliGRAM(s) Oral every 6 hours PRN Temp greater or equal to 38C (100.4F), Mild Pain (1 - 3), Moderate Pain (4 - 6)  guaiFENesin Oral Liquid (Sugar-Free) 100 milliGRAM(s) Oral every 6 hours PRN Cough  heparin   Injectable 97073 Unit(s) IV Push every 6 hours PRN For aPTT less than 40  heparin   Injectable 5000 Unit(s) IV Push every 6 hours PRN For aPTT between 40 - 57       Allergies    iodine containing compounds (Short breath; Anaphylaxis)  Seafood (Short breath; Anaphylaxis)    Intolerances      Vital Signs Last 24 Hrs  T(C): 37.6 (16 Dec 2021 22:14), Max: 37.6 (16 Dec 2021 22:14)  T(F): 99.7 (16 Dec 2021 22:14), Max: 99.7 (16 Dec 2021 22:14)  HR: 94 (16 Dec 2021 22:14) (92 - 99)  BP: 136/72 (16 Dec 2021 22:14) (118/66 - 136/72)  BP(mean): --  RR: 18 (16 Dec 2021 22:14) (17 - 18)  SpO2: 96% (16 Dec 2021 22:14) (96% - 99%)  CAPILLARY BLOOD GLUCOSE          Physical Exam:    Daily     Daily   General:  Well appearing, NAD, not cachetic  HEENT:  Nonicteric, PERRLA  CV:  RRR, S1S2   Lungs:  CTA B/L, no wheezes, rales, rhonchi  Abdomen:  Soft, non-tender, no distended, positive BS  Extremities:  2+ pulses, no c/c, no edema  Skin:  Warm and dry, no rashes  :  No sherman  Neuro:  AAOx3, non-focal, grossly intact                                                                                                                                                                                                                                                                                                LABS:                               10.8   9.61  )-----------( 480      ( 16 Dec 2021 05:48 )             34.4                      12-16    137  |  101  |  10  ----------------------------<  153<H>  3.6   |  25  |  0.82    Ca    8.8      16 Dec 2021 05:48  Phos  3.0     12-16  Mg     2.20     12-16                         RADIOLOGY & ADDITIONAL TESTS         I personally reviewed: [  ]EKG   [  ]CXR    [  ] CT      A/P:         Discussed with :     [facility]' Notes   Time spent :  
NO acute SOB  Saturating well on RA    Vital Signs Last 24 Hrs  T(C): 37 (19 Dec 2021 05:00), Max: 37.1 (18 Dec 2021 16:55)  T(F): 98.6 (19 Dec 2021 05:00), Max: 98.8 (18 Dec 2021 16:55)  HR: 96 (19 Dec 2021 05:00) (96 - 100)  BP: 124/76 (19 Dec 2021 05:00) (124/76 - 128/68)  BP(mean): --  RR: 18 (19 Dec 2021 05:00) (17 - 18)  SpO2: 100% (19 Dec 2021 05:00) (98% - 100%)    I&O's Summary      Physical Exam:    General:  Well appearing, NAD, not cachetic  HEENT:  Nonicteric, PERRLA  CV:  RRR, S1S2   Lungs:  CTA B/L, no wheezes, rales, rhonchi  Abdomen:  Soft, non-tender, no distended, positive BS  Extremities:  2+ pulses, no c/c, no edema  Skin:  Warm and dry, no rashes  :  No sherman  Neuro:  AAOx3, non-focal, grossly intact                                                                                                                                                                                                                                                                                                LABS:                               10.4   10.33 )-----------( 565      ( 18 Dec 2021 07:55 )             34.0                      12-18    132<L>  |  102  |  8   ----------------------------<  111<H>  4.0   |  26  |  0.88    Ca    9.5      18 Dec 2021 07:55  Phos  3.2     12-17  Mg     2.30     12-17                         RADIOLOGY & ADDITIONAL TESTS         I personally reviewed: [  ]EKG   [  ]CXR    [  ] CT      A/P:         Discussed with :     [facility]' Notes   Time spent :  
Date of service: 12-18-21 @ 23:42      Patient is a 51y old  Female who presents with a chief complaint of PE (17 Dec 2021 12:39)                                                               INTERVAL HPI/OVERNIGHT EVENTS:    REVIEW OF SYSTEMS:     CONSTITUTIONAL: No weakness, fevers or chills  RESPIRATORY: No cough, wheezing,  No shortness of breath  CARDIOVASCULAR: No chest pain or palpitations  GASTROINTESTINAL: No abdominal pain  . No nausea, vomiting, or hematemesis; No diarrhea or constipation. No melena or hematochezia.  GENITOURINARY: No dysuria, frequency or hematuria  NEUROLOGICAL: No numbness or weakness  MSK pain                                                                                                                                                                                                                                                                                 Medications:  MEDICATIONS  (STANDING):  heparin  Infusion.  Unit(s)/Hr (22 mL/Hr) IV Continuous <Continuous>    MEDICATIONS  (PRN):  acetaminophen     Tablet .. 650 milliGRAM(s) Oral every 6 hours PRN Temp greater or equal to 38C (100.4F), Mild Pain (1 - 3), Moderate Pain (4 - 6)  benzonatate 200 milliGRAM(s) Oral every 8 hours PRN Cough  guaiFENesin Oral Liquid (Sugar-Free) 100 milliGRAM(s) Oral every 6 hours PRN Cough  heparin   Injectable 62196 Unit(s) IV Push every 6 hours PRN For aPTT less than 40  heparin   Injectable 5000 Unit(s) IV Push every 6 hours PRN For aPTT between 40 - 57       Allergies    iodine containing compounds (Short breath; Anaphylaxis)  Seafood (Short breath; Anaphylaxis)    Intolerances      Vital Signs Last 24 Hrs  T(C): 37.1 (18 Dec 2021 22:00), Max: 37.1 (18 Dec 2021 16:55)  T(F): 98.8 (18 Dec 2021 22:00), Max: 98.8 (18 Dec 2021 16:55)  HR: 100 (18 Dec 2021 22:00) (96 - 100)  BP: 128/68 (18 Dec 2021 22:00) (126/79 - 128/76)  BP(mean): --  RR: 17 (18 Dec 2021 22:00) (17 - 18)  SpO2: 99% (18 Dec 2021 22:00) (97% - 99%)  CAPILLARY BLOOD GLUCOSE          Physical Exam:    Daily     Daily   General:  Well appearing, NAD, not cachetic  HEENT:  Nonicteric, PERRLA  CV:  RRR, S1S2   Lungs:  CTA B/L, no wheezes, rales, rhonchi  Abdomen:  Soft, non-tender, no distended, positive BS  Extremities:  2+ pulses, no c/c, no edema  Skin:  Warm and dry, no rashes  :  No sherman  Neuro:  AAOx3, non-focal, grossly intact                                                                                                                                                                                                                                                                                                LABS:                               10.4   10.33 )-----------( 565      ( 18 Dec 2021 07:55 )             34.0                      12-18    132<L>  |  102  |  8   ----------------------------<  111<H>  4.0   |  26  |  0.88    Ca    9.5      18 Dec 2021 07:55  Phos  3.2     12-17  Mg     2.30     12-17                         RADIOLOGY & ADDITIONAL TESTS         I personally reviewed: [  ]EKG   [  ]CXR    [  ] CT      A/P:         Discussed with :     [facility]' Notes   Time spent :  
pt stable for dc   discussed with PA  change to eliquis and fu with pmd as op  
Date of service: 12-17-21 @ 12:40      Patient is a 51y old  Female who presents with a chief complaint of PE (17 Dec 2021 11:58)                                                               INTERVAL HPI/OVERNIGHT EVENTS:    REVIEW OF SYSTEMS:     CONSTITUTIONAL: No weakness, fevers or chills  EYES/ENT: No visual changes , no ear ache   NECK: No pain or stiffness  RESPIRATORY: No cough, wheezing,  No shortness of breath  CARDIOVASCULAR: No chest pain or palpitations  GASTROINTESTINAL: No abdominal pain  . No nausea, vomiting, or hematemesis; No diarrhea or constipation. No melena or hematochezia.  GENITOURINARY: No dysuria, frequency or hematuria  NEUROLOGICAL: No numbness or weakness  SKIN: No itching, burning, rashes, or lesions                                                                                                                                                                                                                                                                                 Medications:  MEDICATIONS  (STANDING):  heparin  Infusion.  Unit(s)/Hr (22 mL/Hr) IV Continuous <Continuous>    MEDICATIONS  (PRN):  acetaminophen     Tablet .. 650 milliGRAM(s) Oral every 6 hours PRN Temp greater or equal to 38C (100.4F), Mild Pain (1 - 3), Moderate Pain (4 - 6)  benzonatate 200 milliGRAM(s) Oral every 8 hours PRN Cough  guaiFENesin Oral Liquid (Sugar-Free) 100 milliGRAM(s) Oral every 6 hours PRN Cough  heparin   Injectable 46835 Unit(s) IV Push every 6 hours PRN For aPTT less than 40  heparin   Injectable 5000 Unit(s) IV Push every 6 hours PRN For aPTT between 40 - 57       Allergies    iodine containing compounds (Short breath; Anaphylaxis)  Seafood (Short breath; Anaphylaxis)    Intolerances      Vital Signs Last 24 Hrs  T(C): 36.7 (17 Dec 2021 11:41), Max: 37.6 (16 Dec 2021 22:14)  T(F): 98 (17 Dec 2021 11:41), Max: 99.7 (16 Dec 2021 22:14)  HR: 101 (17 Dec 2021 11:41) (88 - 101)  BP: 123/64 (17 Dec 2021 11:41) (122/68 - 136/72)  BP(mean): --  RR: 17 (17 Dec 2021 11:41) (17 - 18)  SpO2: 95% (17 Dec 2021 11:41) (95% - 98%)  CAPILLARY BLOOD GLUCOSE          Physical Exam:    Daily     Daily   General:  Well appearing, NAD, not cachetic  HEENT:  Nonicteric, PERRLA  CV:  RRR, S1S2   Lungs:  CTA B/L, no wheezes, rales, rhonchi  Abdomen:  Soft, non-tender, no distended, positive BS  Extremities:  2+ pulses, no c/c, no edema  Skin:  Warm and dry, no rashes  :  No sherman  Neuro:  AAOx3, non-focal, grossly intact                                                                                                                                                                                                                                                                                                LABS:                               11.8   8.79  )-----------( 552      ( 17 Dec 2021 07:19 )             37.9                      12-17    137  |  97<L>  |  10  ----------------------------<  109<H>  3.9   |  26  |  0.86    Ca    9.5      17 Dec 2021 07:19  Phos  3.2     12-17  Mg     2.30     12-17                         RADIOLOGY & ADDITIONAL TESTS         I personally reviewed: [  ]EKG   [  ]CXR    [  ] CT      A/P:         Discussed with :     [facility]' Notes   Time spent :  
Date of service: 12-15-21 @ 15:00      Patient is a 51y old  Female who presents with a chief complaint of PE (15 Dec 2021 04:32)                                                               INTERVAL HPI/OVERNIGHT EVENTS:    REVIEW OF SYSTEMS:     CONSTITUTIONAL: No weakness, fevers or chills  RESPIRATORY: No cough, wheezing,  No shortness of breath  CARDIOVASCULAR: No chest pain or palpitations  GASTROINTESTINAL: No abdominal pain  . No nausea, vomiting, or hematemesis; No diarrhea or constipation. No melena or hematochezia.  GENITOURINARY: No dysuria, frequency or hematuria  NEUROLOGICAL: No numbness or weakness  Back pain                                                                                                                                                                                                                                                                                   Medications:  MEDICATIONS  (STANDING):  heparin  Infusion.  Unit(s)/Hr (22 mL/Hr) IV Continuous <Continuous>    MEDICATIONS  (PRN):  heparin   Injectable 52944 Unit(s) IV Push every 6 hours PRN For aPTT less than 40  heparin   Injectable 5000 Unit(s) IV Push every 6 hours PRN For aPTT between 40 - 57       Allergies    iodine containing compounds (Short breath; Anaphylaxis)  Seafood (Short breath; Anaphylaxis)    Intolerances      Vital Signs Last 24 Hrs  T(C): 36.9 (15 Dec 2021 10:35), Max: 37.3 (14 Dec 2021 22:15)  T(F): 98.5 (15 Dec 2021 10:35), Max: 99.1 (14 Dec 2021 22:15)  HR: 92 (15 Dec 2021 10:35) (92 - 110)  BP: 127/73 (15 Dec 2021 10:35) (125/72 - 129/85)  BP(mean): --  RR: 18 (15 Dec 2021 10:35) (17 - 18)  SpO2: 97% (15 Dec 2021 10:35) (97% - 100%)  CAPILLARY BLOOD GLUCOSE          Physical Exam:    Daily Height in cm: 175.26 (15 Dec 2021 04:33)    Daily   General:  Well appearing, NAD, not cachetic  HEENT:  Nonicteric, PERRLA  CV:  RRR, S1S2   Lungs:  CTA B/L, no wheezes, rales, rhonchi  Abdomen:  Soft, non-tender, no distended, positive BS  Extremities:  2+ pulses, no c/c, no edema  Skin:  Warm and dry, no rashes  :  No sherman  Neuro:  AAOx3, non-focal, grossly intact                                                                                                                                                                                                                                                                                                LABS:                               11.7   12.73 )-----------( 495      ( 15 Dec 2021 04:04 )             37.9                      12-15    137  |  100  |  12  ----------------------------<  159<H>  3.5   |  24  |  0.80    Ca    8.9      15 Dec 2021 04:04  Phos  2.9     12-15  Mg     2.20     12-15    TPro  8.0  /  Alb  3.7  /  TBili  0.7  /  DBili  x   /  AST  31  /  ALT  45<H>  /  AlkPhos  101  12-14                       RADIOLOGY & ADDITIONAL TESTS         I personally reviewed: [  ]EKG   [  ]CXR    [  ] CT      A/P:         Discussed with :     [facility] consultants' Notes   Time spent :

## 2021-12-20 NOTE — DISCHARGE NOTE NURSING/CASE MANAGEMENT/SOCIAL WORK - NSDCPEFALRISK_GEN_ALL_CORE
For information on Fall & Injury Prevention, visit: [email or web address] OR  [email or web address] OR  https://www.cdc.gov/steadi/patient.html

## 2021-12-20 NOTE — DISCHARGE NOTE NURSING/CASE MANAGEMENT/SOCIAL WORK - PATIENT PORTAL LINK FT
You can access the FollowMyHealth Patient Portal offered by [facility] by registering at the following website: [email or web address]. By joining [employer] [facility] [employer]’s FollowMyHealth portal, you will also be able to view your health information using other applications (apps) compatible with our system.

## 2021-12-21 ENCOUNTER — APPOINTMENT (OUTPATIENT)
Dept: PULMONOLOGY | Facility: CLINIC | Age: 51
End: 2021-12-21
Payer: MEDICAID

## 2021-12-21 DIAGNOSIS — U07.1 COVID-19: ICD-10-CM

## 2021-12-21 PROCEDURE — 99202 OFFICE O/P NEW SF 15 MIN: CPT | Mod: 95

## 2021-12-21 NOTE — PLAN
[FreeTextEntry1] : 51 year old female with no significant past medical history present with COVID-19 virus.  She will stay on Eliquis for at least 6 months time.  Will call pt tomorrow to see if she was able to obtain a pulse oximeter.  I told pt to call the office should her oxygen saturations be below 92%.  \par Clearly had pulmonary infarction\par Will need follow up chest Xray in 3-4 weeks and follow up CTPA at 3 months\par \par I was present for the key portions of the history elicited by the nurse practitioner. I agree with the assessment and plan as written\par

## 2021-12-21 NOTE — HISTORY OF PRESENT ILLNESS
[Home] : at home, [unfilled] , at the time of the visit. [Medical Office: ([facility])___] : at the medical office located in  [Verbal consent obtained from patient] : the patient, [unfilled] [FreeTextEntry1] : 51 year old female with no significant past medical history present with COVID-19 virus.  She initially presented with clavicle and chest pain to the ED.  She was swabbed for COVID and incidentally came back positive.  She had a CTA that was positive for pulmonary embolus; she was started on Eliquis.  She currently denies any respiratory symptoms at this time.  She continues to have the chest pressure over her right clavicle but it has greatly improved since starting the Eliquis.  \par She denies fever, chills.  Does have a mild cough.

## 2022-06-16 DIAGNOSIS — Z01.812 ENCOUNTER FOR PREPROCEDURAL LABORATORY EXAMINATION: ICD-10-CM

## 2022-07-12 LAB — SARS-COV-2 N GENE NPH QL NAA+PROBE: NOT DETECTED

## 2022-07-13 ENCOUNTER — APPOINTMENT (OUTPATIENT)
Dept: PULMONOLOGY | Facility: CLINIC | Age: 52
End: 2022-07-13

## 2022-07-13 VITALS
SYSTOLIC BLOOD PRESSURE: 128 MMHG | TEMPERATURE: 98 F | DIASTOLIC BLOOD PRESSURE: 72 MMHG | WEIGHT: 270 LBS | OXYGEN SATURATION: 99 % | BODY MASS INDEX: 39.99 KG/M2 | HEART RATE: 84 BPM | HEIGHT: 69 IN

## 2022-07-13 DIAGNOSIS — G47.20 CIRCADIAN RHYTHM SLEEP DISORDER, UNSPECIFIED TYPE: ICD-10-CM

## 2022-07-13 DIAGNOSIS — I26.99 OTHER PULMONARY EMBOLISM W/OUT ACUTE COR PULMONALE: ICD-10-CM

## 2022-07-13 PROCEDURE — 94010 BREATHING CAPACITY TEST: CPT | Mod: GC

## 2022-07-13 PROCEDURE — 94729 DIFFUSING CAPACITY: CPT | Mod: GC

## 2022-07-13 PROCEDURE — ZZZZZ: CPT

## 2022-07-13 PROCEDURE — 94726 PLETHYSMOGRAPHY LUNG VOLUMES: CPT | Mod: GC

## 2022-07-13 PROCEDURE — 99213 OFFICE O/P EST LOW 20 MIN: CPT | Mod: GC,25

## 2022-07-13 RX ORDER — APIXABAN 5 MG/1
5 TABLET, FILM COATED ORAL
Qty: 180 | Refills: 3 | Status: COMPLETED | COMMUNITY
Start: 2021-12-21 | End: 2022-04-01

## 2022-07-13 NOTE — ASSESSMENT
[FreeTextEntry1] : 52 y/o F w/ recent covid-19 infection complicated by pulmonary embolism here for post-tele visit follow up. Last seen via tele 12/2021. Found to have a segmental PE with associated pulmonary infarct of the RLL. Stopped taking eliquis in May because of nausea.  Denies fever, chills, nausea, vomiting, diarrhea, chest pain, dyspnea. She reports day time sleepiness but feels very awake during the day. She is open to the idea of doing another CT chest and obtaining a full sleep study. Denies any other acute complaints. \par \par CTA PE protocol 12/2021\par 1. There is acute pulmonary embolus in the lobar artery extending into the right middle and lower lobar arteries and the segmental and subsegmental divisions of the right lower lobe.\par 2. The right lower lobe opacities and subtle areas of hypoenhancement are concerning for pulmonary infarction.\par 3. Additional subcentimeter central and peripheral opacities throughout the left upper and lower lobes are of unclear etiology.\par \par Bilateral LE venous dopplers 12/2021\par -No DVT.\par \par #COVID-19\par #Pulmonary Embolism. \par -Patient ordered for repeat CTA PE protocol although -PFTs without restrictive, obstructive or diffusion defect. \par -Off Eliquis since May. \par \par #Day time sleepiness.\par -Patient to schedule an appointment with our sleep medicine colleagues. \par \par \par

## 2022-07-13 NOTE — PHYSICAL EXAM
[No Acute Distress] : no acute distress [Normal Appearance] : normal appearance [No Neck Mass] : no neck mass [Normal Rate/Rhythm] : normal rate/rhythm [Normal S1, S2] : normal s1, s2 [No Murmurs] : no murmurs [No Resp Distress] : no resp distress [Clear to Auscultation Bilaterally] : clear to auscultation bilaterally [No Abnormalities] : no abnormalities [Normal Gait] : normal gait [No Clubbing] : no clubbing [No Cyanosis] : no cyanosis [No Edema] : no edema [FROM] : FROM [Normal Color/ Pigmentation] : normal color/ pigmentation [No Focal Deficits] : no focal deficits [Oriented x3] : oriented x3 [Normal Affect] : normal affect [Low Lying Soft Palate] : low lying soft palate [IV] : Mallampati Class: IV

## 2022-07-13 NOTE — HISTORY OF PRESENT ILLNESS
[TextBox_4] : 52 y/o F w/ recent covid-19 infection complicated by pulmonary embolism here for post-tele visit follow up. Last seen via tele 12/2021. Found to have a segmental PE with associated pulmonary infarct of the RLL. Stopped taking eliquis in May because of nausea.  Denies fever, chills, nausea, vomiting, diarrhea, chest pain, dyspnea. She reports day time sleepiness but feels very awake during the night. She is open to the idea of doing another CT chest and obtaining a full sleep study. Denies any other acute complaints.\par Her current major complaint is sleep inversion or possibly a circadian sleep related disorder \par \par \par CTA PE protocol 12/2021\par 1. There is acute pulmonary embolus in the lobar artery extending into the right middle and lower lobar arteries and the segmental and subsegmental divisions of the right lower lobe.\par 2. The right lower lobe opacities and subtle areas of hypoenhancement are concerning for pulmonary infarction.\par 3. Additional subcentimeter central and peripheral opacities throughout the left upper and lower lobes are of unclear etiology.\par \par Bilateral LE venous dopplers 12/2021\par -No DVT.\par \par #COVID-19\par #Pulmonary Embolism. \par -Patient ordered for d-dimer and repeat CTA PE protocol.\par -Continue eliquis for now.\par -PFTs without restrictive, obstructive or diffusion defect. \par \par RTC in 3 months.\par

## 2022-07-19 ENCOUNTER — NON-APPOINTMENT (OUTPATIENT)
Age: 52
End: 2022-07-19

## 2022-07-20 ENCOUNTER — NON-APPOINTMENT (OUTPATIENT)
Age: 52
End: 2022-07-20

## 2022-09-08 ENCOUNTER — APPOINTMENT (OUTPATIENT)
Dept: PULMONOLOGY | Facility: CLINIC | Age: 52
End: 2022-09-08

## 2022-09-08 VITALS
HEART RATE: 94 BPM | TEMPERATURE: 97.5 F | WEIGHT: 277 LBS | BODY MASS INDEX: 41.03 KG/M2 | OXYGEN SATURATION: 98 % | SYSTOLIC BLOOD PRESSURE: 120 MMHG | DIASTOLIC BLOOD PRESSURE: 81 MMHG | HEIGHT: 69 IN

## 2022-09-08 DIAGNOSIS — G47.33 OBSTRUCTIVE SLEEP APNEA (ADULT) (PEDIATRIC): ICD-10-CM

## 2022-09-08 DIAGNOSIS — F51.12 INSUFFICIENT SLEEP SYNDROME: ICD-10-CM

## 2022-09-08 DIAGNOSIS — E66.01 MORBID (SEVERE) OBESITY DUE TO EXCESS CALORIES: ICD-10-CM

## 2022-09-08 PROCEDURE — 99213 OFFICE O/P EST LOW 20 MIN: CPT

## 2022-09-13 PROBLEM — E66.01 OBESITY, CLASS III, BMI 40-49.9 (MORBID OBESITY): Status: ACTIVE | Noted: 2022-09-13

## 2022-09-13 NOTE — ASSESSMENT
[FreeTextEntry1] : Sleep deprivation, EDS, irregular sleep schedule, ?underlying sleep disordered breathing\par Educated on sleep hygiene and provided with sleep diaries to log her sleep. Goal 7-8 hours a night, consistent schedule. Daytime nap 30-40 minutes \par \par Plan:\par HST to evaluate for sleep disordered breathing.   [Obesity, Class III, BMI 40-49.9 (E66.01)] : macrophage activation syndrome

## 2022-09-13 NOTE — HISTORY OF PRESENT ILLNESS
[FreeTextEntry1] : 53 yo F with a h/o COVID infection complicated by segmental PEs, pulmonary infarct in RLL in 12/2021 who presents for initial sleep evaluation.\par Meds: vitamins only - Tumeric, Vitamin D\par \par Sleep history: \par Ever since her PE, has been having "Weird sleep patterns"\par Has worked many hours 7 days a week, has her own business- mixture of overnights, days, evenings. Going to school as well, online classes, Has to get up early to get her kids on the bus. Sleep deprived for years\par \par When she returned home from the hospital she has been sleeping "many hours", tired all the time, sleeping throughout the day, very deep sleep and awake at night. Has "flipped" her days and nights.\par Very vivid dreams, will wake up and go back to sleep, continues to have the same dreams.\par Main complaints of nonrestorative sleep, severe snoring and daytime somnolence.\par \par No set bedtime, gets calls from work that wake her\par 9 pm is wind down time when her kids go to bed, and can go to bed and fall asleep right away. Out of bed at 6-6:30 pm to get her kids ready for school. \par Unrefreshed upon awakening. \par Morning headaches:  denies\par Dry mouth/throat: denies\par Weight trend: stable, BMI 40\par Denies drowsy driving, denies any accidents or near accidents. \par EDS with ESS of 18\par \par PFTs 7/13/22: normal spirometry, lung volumes and DLCO. \par \par Quality Metrics:\par Smoking history: never smoker\par ESS: 18\par \par Vaccines: \par COVID: Pfizer x2, no booster\par Influenza: doesn't receive\par Pneumococcal: NA\par  [Obstructive Sleep Apnea] : obstructive sleep apnea

## 2022-09-13 NOTE — REVIEW OF SYSTEMS
[Obesity] : obesity [Difficulty Initiating Sleep] : no difficulty falling asleep [Lower Extremity Discomfort] : no lower extremity discomfort [Unusual Sleep Behavior] : no unusual sleep behavior [Hypersomnolence] : not sleeping much more than usual [Negative] : Psychiatric [FreeTextEntry3] : per hpi [FreeTextEntry8] : per hpi

## 2022-09-13 NOTE — PHYSICAL EXAM
[General Appearance - Well Developed] : well developed [Normal Appearance] : normal appearance [Well Groomed] : well groomed [General Appearance - Well Nourished] : well nourished [No Deformities] : no deformities [General Appearance - In No Acute Distress] : no acute distress [Normal Conjunctiva] : the conjunctiva exhibited no abnormalities [Eyelids - No Xanthelasma] : the eyelids demonstrated no xanthelasmas [Low Lying Soft Palate] : low lying soft palate [Enlarged Base of the Tongue] : enlargement of the base of the tongue [Neck Appearance] : the appearance of the neck was normal [Heart Rate And Rhythm] : heart rate was normal and rhythm regular [Heart Sounds] : normal S1 and S2 [] : no respiratory distress [Respiration, Rhythm And Depth] : normal respiratory rhythm and effort [Exaggerated Use Of Accessory Muscles For Inspiration] : no accessory muscle use [Auscultation Breath Sounds / Voice Sounds] : lungs were clear to auscultation bilaterally [Musculoskeletal - Swelling] : no joint swelling seen [Motor Tone] : muscle strength and tone were normal [Nail Clubbing] : no clubbing of the fingernails [Cyanosis, Localized] : no localized cyanosis [Skin Color & Pigmentation] : normal skin color and pigmentation [Skin Lesions] : no skin lesions [Cranial Nerves] : cranial nerves 2-12 were intact [Motor Exam] : the motor exam was normal [Oriented To Time, Place, And Person] : oriented to person, place, and time [Impaired Insight] : insight and judgment were intact [Affect] : the affect was normal [Mood] : the mood was normal

## 2022-10-04 ENCOUNTER — EMERGENCY (EMERGENCY)
Facility: HOSPITAL | Age: 52
LOS: 1 days | Discharge: HOME | End: 2022-10-04
Attending: EMERGENCY MEDICINE | Admitting: EMERGENCY MEDICINE

## 2022-10-04 VITALS
RESPIRATION RATE: 18 BRPM | DIASTOLIC BLOOD PRESSURE: 90 MMHG | HEIGHT: 69 IN | SYSTOLIC BLOOD PRESSURE: 146 MMHG | OXYGEN SATURATION: 99 % | TEMPERATURE: 98 F

## 2022-10-04 DIAGNOSIS — D33.4 BENIGN NEOPLASM OF SPINAL CORD: Chronic | ICD-10-CM

## 2022-10-04 LAB
ALBUMIN SERPL ELPH-MCNC: 3.9 G/DL — SIGNIFICANT CHANGE UP (ref 3.3–5)
ALP SERPL-CCNC: 102 U/L — SIGNIFICANT CHANGE UP (ref 40–120)
ALT FLD-CCNC: 34 U/L — HIGH (ref 4–33)
ANION GAP SERPL CALC-SCNC: 16 MMOL/L — HIGH (ref 7–14)
APPEARANCE UR: ABNORMAL
APTT BLD: 24.7 SEC — LOW (ref 27–36.3)
AST SERPL-CCNC: 32 U/L — SIGNIFICANT CHANGE UP (ref 4–32)
BACTERIA # UR AUTO: ABNORMAL
BASOPHILS # BLD AUTO: 0.04 K/UL — SIGNIFICANT CHANGE UP (ref 0–0.2)
BASOPHILS NFR BLD AUTO: 0.4 % — SIGNIFICANT CHANGE UP (ref 0–2)
BILIRUB SERPL-MCNC: 0.3 MG/DL — SIGNIFICANT CHANGE UP (ref 0.2–1.2)
BILIRUB UR-MCNC: NEGATIVE — SIGNIFICANT CHANGE UP
BUN SERPL-MCNC: 13 MG/DL — SIGNIFICANT CHANGE UP (ref 7–23)
CALCIUM SERPL-MCNC: 9.7 MG/DL — SIGNIFICANT CHANGE UP (ref 8.4–10.5)
CHLORIDE SERPL-SCNC: 102 MMOL/L — SIGNIFICANT CHANGE UP (ref 98–107)
CO2 SERPL-SCNC: 20 MMOL/L — LOW (ref 22–31)
COLOR SPEC: YELLOW — SIGNIFICANT CHANGE UP
CREAT SERPL-MCNC: 0.82 MG/DL — SIGNIFICANT CHANGE UP (ref 0.5–1.3)
DIFF PNL FLD: NEGATIVE — SIGNIFICANT CHANGE UP
EGFR: 87 ML/MIN/1.73M2 — SIGNIFICANT CHANGE UP
EOSINOPHIL # BLD AUTO: 0.01 K/UL — SIGNIFICANT CHANGE UP (ref 0–0.5)
EOSINOPHIL NFR BLD AUTO: 0.1 % — SIGNIFICANT CHANGE UP (ref 0–6)
EPI CELLS # UR: 2 /HPF — SIGNIFICANT CHANGE UP (ref 0–5)
GLUCOSE SERPL-MCNC: 111 MG/DL — HIGH (ref 70–99)
GLUCOSE UR QL: NEGATIVE — SIGNIFICANT CHANGE UP
HCG SERPL-ACNC: <5 MIU/ML — SIGNIFICANT CHANGE UP
HCT VFR BLD CALC: 44.5 % — SIGNIFICANT CHANGE UP (ref 34.5–45)
HGB BLD-MCNC: 13.2 G/DL — SIGNIFICANT CHANGE UP (ref 11.5–15.5)
HYALINE CASTS # UR AUTO: 0 /LPF — SIGNIFICANT CHANGE UP (ref 0–7)
IANC: 5.71 K/UL — SIGNIFICANT CHANGE UP (ref 1.8–7.4)
IMM GRANULOCYTES NFR BLD AUTO: 0.3 % — SIGNIFICANT CHANGE UP (ref 0–0.9)
INR BLD: 1.09 RATIO — SIGNIFICANT CHANGE UP (ref 0.88–1.16)
KETONES UR-MCNC: NEGATIVE — SIGNIFICANT CHANGE UP
LEUKOCYTE ESTERASE UR-ACNC: ABNORMAL
LYMPHOCYTES # BLD AUTO: 2.53 K/UL — SIGNIFICANT CHANGE UP (ref 1–3.3)
LYMPHOCYTES # BLD AUTO: 28.4 % — SIGNIFICANT CHANGE UP (ref 13–44)
MAGNESIUM SERPL-MCNC: 2.1 MG/DL — SIGNIFICANT CHANGE UP (ref 1.6–2.6)
MCHC RBC-ENTMCNC: 25.9 PG — LOW (ref 27–34)
MCHC RBC-ENTMCNC: 29.7 GM/DL — LOW (ref 32–36)
MCV RBC AUTO: 87.3 FL — SIGNIFICANT CHANGE UP (ref 80–100)
MONOCYTES # BLD AUTO: 0.6 K/UL — SIGNIFICANT CHANGE UP (ref 0–0.9)
MONOCYTES NFR BLD AUTO: 6.7 % — SIGNIFICANT CHANGE UP (ref 2–14)
NEUTROPHILS # BLD AUTO: 5.71 K/UL — SIGNIFICANT CHANGE UP (ref 1.8–7.4)
NEUTROPHILS NFR BLD AUTO: 64.1 % — SIGNIFICANT CHANGE UP (ref 43–77)
NITRITE UR-MCNC: NEGATIVE — SIGNIFICANT CHANGE UP
NRBC # BLD: 0 /100 WBCS — SIGNIFICANT CHANGE UP (ref 0–0)
NRBC # FLD: 0 K/UL — SIGNIFICANT CHANGE UP (ref 0–0)
PH UR: 6.5 — SIGNIFICANT CHANGE UP (ref 5–8)
PLATELET # BLD AUTO: 369 K/UL — SIGNIFICANT CHANGE UP (ref 150–400)
POTASSIUM SERPL-MCNC: 5.4 MMOL/L — HIGH (ref 3.5–5.3)
POTASSIUM SERPL-SCNC: 5.4 MMOL/L — HIGH (ref 3.5–5.3)
PROT SERPL-MCNC: 8 G/DL — SIGNIFICANT CHANGE UP (ref 6–8.3)
PROT UR-MCNC: ABNORMAL
PROTHROM AB SERPL-ACNC: 12.7 SEC — SIGNIFICANT CHANGE UP (ref 10.5–13.4)
RBC # BLD: 5.1 M/UL — SIGNIFICANT CHANGE UP (ref 3.8–5.2)
RBC # FLD: 14.4 % — SIGNIFICANT CHANGE UP (ref 10.3–14.5)
RBC CASTS # UR COMP ASSIST: 3 /HPF — SIGNIFICANT CHANGE UP (ref 0–4)
SARS-COV-2 RNA SPEC QL NAA+PROBE: SIGNIFICANT CHANGE UP
SODIUM SERPL-SCNC: 138 MMOL/L — SIGNIFICANT CHANGE UP (ref 135–145)
SP GR SPEC: 1.02 — SIGNIFICANT CHANGE UP (ref 1.01–1.05)
UROBILINOGEN FLD QL: ABNORMAL
WBC # BLD: 8.92 K/UL — SIGNIFICANT CHANGE UP (ref 3.8–10.5)
WBC # FLD AUTO: 8.92 K/UL — SIGNIFICANT CHANGE UP (ref 3.8–10.5)
WBC UR QL: 6 /HPF — HIGH (ref 0–5)

## 2022-10-04 PROCEDURE — 99285 EMERGENCY DEPT VISIT HI MDM: CPT

## 2022-10-04 PROCEDURE — 74176 CT ABD & PELVIS W/O CONTRAST: CPT | Mod: 26,MA

## 2022-10-04 PROCEDURE — 93971 EXTREMITY STUDY: CPT | Mod: 26,LT

## 2022-10-04 RX ORDER — ACETAMINOPHEN 500 MG
1000 TABLET ORAL ONCE
Refills: 0 | Status: COMPLETED | OUTPATIENT
Start: 2022-10-04 | End: 2022-10-04

## 2022-10-04 RX ADMIN — Medication 400 MILLIGRAM(S): at 18:40

## 2022-10-04 NOTE — ED PROVIDER NOTE - OBJECTIVE STATEMENT
51 yo F, hx of DVT/PE in 12/21 (was on AC but then stopped this on her own on May) and benign spinal tumor s/p resection c/b chronic urinary incontinence managed w/ self cath and chronic disequilibrium 2/2 contractions of the bilateral toes, presenting w/ R sided lower back/fank/hip pain that started y/o. No remarkable preceding events or trauma. No associated bowel incontinence, no change to chronic bladder incontinence. No focal weakness. Reports chronic groin swelling, no perineal numbness. No associated fevers, chills, cp, sob. Severe airway edema allergy to iodine contrast.
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
## 2022-10-04 NOTE — ED PROVIDER NOTE - CLINICAL SUMMARY MEDICAL DECISION MAKING FREE TEXT BOX
51 yo F, hx of DVT/PE in 12/21 (was on AC but then stopped this on her own on May) and benign spinal tumor s/p resection c/b chronic urinary incontinence managed w/ self cath and chronic disequilibrium 2/2 contractions of the bilateral toes, presenting w/ R sided lower back/fank/hip pain that started y/o. vss. at neurological baseline, no concern for acute cord syndrome. r/out pyelo/uti, consider msk source of pain, retroperitoneal/visceral source of pain. ct a/p, cbc, cmp, ua/ucx, pain control. r/out dvt as source of pain in a pt w/ a hx of same.

## 2022-10-04 NOTE — ED PROVIDER NOTE - ATTENDING CONTRIBUTION TO CARE
Dr. Gupta:  I have personally performed a face to face bedside history and physical examination of this patient. I have discussed the history, examination, review of systems, assessment and plan of management with the resident. I have reviewed the electronic medical record and amended it to reflect my history, review of systems, physical exam, assessment and plan.    52F h/o PE (Dec 2021) on eliquis which she self discontinued in May 2022, benign spinal tumor leading to urinary incontinence (self-caths), c/o right lower back and hip pain radiating down RLE.  Pain onset yesterday, no injury or triggering event.  Denies fever/chills, cp, sob, n/v/d.  No bowel incontinence.  Triage note states that patient was "unable to feel her self-cath today" but patient without saddle anesthesia on exam.    Exma:  - nad  - rrr  - ctab   -abd soft, RLQ TTP  - no midline spinal TTP, +R paraspinal pain; 5/5 strength BLE with chronic bilateral toes contractures; no saddle anesthesia on exam    A/P  - ddx includes appendicitis vs other intra-abd pathology; complicated UTI, DVT; low suspicion for acute cord involvement as patient's neuro exam at her baseline  - cbc, cmp, lipase, ua, urine culture, CT abd/pelvis (pt with contrast allergy - throat swelling); US RLE Dr. Gupta:  I have personally performed a face to face bedside history and physical examination of this patient. I have discussed the history, examination, review of systems, assessment and plan of management with the resident. I have reviewed the electronic medical record and amended it to reflect my history, review of systems, physical exam, assessment and plan.    52F h/o PE (Dec 2021) on eliquis which she self discontinued in May 2022, benign spinal tumor leading to urinary incontinence (self-caths), c/o right lower back and hip pain radiating down RLE.  Pain onset yesterday, no injury or triggering event.  Denies fever/chills, cp, sob, n/v/d.  No bowel incontinence.  Triage note states that patient was "unable to feel her self-cath today" but patient states that is not accurate.  Instead, states that she feels a "lump" in her "private area" when attempted to self=cath today.    Exam:  - nad  - rrr  - ctab   -abd soft, NTND  - no midline spinal TTP, +R paraspinal pain; 5/5 strength BLE with chronic bilateral toes contractures; no saddle anesthesia on exam altho mildly decreased sensation which patient states is baseline for her    A/P  - ddx complicated UTI, DVT; low suspicion for acute cord involvement as patient's neuro exam at her baseline  - cbc, cmp, lipase, ua, urine culture, US RLE

## 2022-10-04 NOTE — ED PROVIDER NOTE - NS ED ROS FT
GENERAL: no fever  EYES: no eye pain  HEENT: no neck pain  CARDIAC: no chest pain  PULMONARY: no SOB  GI: no abdominal pain  : + recent difficulties passing self cath  SKIN: no rashes  NEURO: no headache  MSK: + lower back pain/flank pain/hip pain.

## 2022-10-04 NOTE — ED PROVIDER NOTE - NSFOLLOWUPINSTRUCTIONS_ED_ALL_ED_FT
--take tylenol or motrin as needed for pain. Take tylenol 1000mg every 6 hours alternating with motrin 600 mg every 6 hours (take tylenol or motrin then three hours later take the other then three hours later take the first).  --today, the lab tests we did include basic blood tests, urine tests, the imaging tests we did include ultrasound lower extremity; results significant for no blood clot, no uti. we have included these test results in your paperwork. please take them to your follow-up appointment  --given that you were in the ED today, we recommend a followup visit with your general doctor (primary care doctor) within 7 days.   --your diagnosis is: hip pain  --return to the ED if pain changes in type/nature, if new neuro symptoms - numbness/weakness/tingling to arms/legs.

## 2022-10-04 NOTE — ED PROVIDER NOTE - PHYSICAL EXAMINATION
Gen: NAD, non-toxic appearing  Head: normal appearing  HEENT: normal conjunctiva  Lung: no respiratory distress, speaking in full sentences, ctab     CV: regular rate and rhythm, no murmurs  Abd: soft, non distended, non tender, referral of pain to right back with palpation over the RLQ   MSK: no visible deformities, no midline tenderness, R sided paraspinal tenderness, able to range the R hip w/ discomfort, + R CVA tenderness  Neuro: alert and grossly oriented, chronic contracture of the toes bilaterally, intact sensation to lower extremities, intact sensation over the medial thigh just distal to the genitalia,   Skin: No adonis rashes

## 2022-10-04 NOTE — ED PROVIDER NOTE - PATIENT PORTAL LINK FT
You can access the FollowMyHealth Patient Portal offered by [facility] by registering at the following website: [email or web address]. By joining [employer]’s FollowMyHealth portal, you will also be able to view your health information using other applications (apps) compatible with our system.

## 2022-10-07 RX ORDER — CEFPODOXIME PROXETIL 100 MG
1 TABLET ORAL
Qty: 14 | Refills: 0
Start: 2022-10-07 | End: 2022-10-13

## 2022-10-07 NOTE — ED POST DISCHARGE NOTE - ADDITIONAL DOCUMENTATION
SPoke with patient and informed of results.  rx for cefpodoxime sent to pharmacy.  Return precautions discussed.